# Patient Record
Sex: FEMALE | Race: WHITE | Employment: OTHER | URBAN - METROPOLITAN AREA
[De-identification: names, ages, dates, MRNs, and addresses within clinical notes are randomized per-mention and may not be internally consistent; named-entity substitution may affect disease eponyms.]

---

## 2018-03-27 ENCOUNTER — HOSPITAL ENCOUNTER (INPATIENT)
Facility: CLINIC | Age: 83
LOS: 3 days | Discharge: NURSING, REHAB OR LONG-TERM CARE | DRG: 871 | End: 2018-03-30
Attending: EMERGENCY MEDICINE | Admitting: INTERNAL MEDICINE
Payer: MEDICARE

## 2018-03-27 ENCOUNTER — APPOINTMENT (OUTPATIENT)
Dept: GENERAL RADIOLOGY | Facility: CLINIC | Age: 83
DRG: 871 | End: 2018-03-27
Attending: EMERGENCY MEDICINE
Payer: MEDICARE

## 2018-03-27 DIAGNOSIS — R65.20 SEVERE SEPSIS (H): ICD-10-CM

## 2018-03-27 DIAGNOSIS — A41.9 SEVERE SEPSIS (H): ICD-10-CM

## 2018-03-27 DIAGNOSIS — J20.9 ACUTE BRONCHITIS, UNSPECIFIED ORGANISM: Primary | ICD-10-CM

## 2018-03-27 DIAGNOSIS — R31.9 URINARY TRACT INFECTION WITH HEMATURIA, SITE UNSPECIFIED: ICD-10-CM

## 2018-03-27 DIAGNOSIS — N39.0 URINARY TRACT INFECTION WITH HEMATURIA, SITE UNSPECIFIED: ICD-10-CM

## 2018-03-27 DIAGNOSIS — I48.20 CHRONIC ATRIAL FIBRILLATION (H): ICD-10-CM

## 2018-03-27 PROBLEM — N30.90 CYSTITIS: Status: ACTIVE | Noted: 2018-03-27

## 2018-03-27 LAB
ALBUMIN SERPL-MCNC: 3.2 G/DL (ref 3.4–5)
ALBUMIN UR-MCNC: 30 MG/DL
ALP SERPL-CCNC: 135 U/L (ref 40–150)
ALT SERPL W P-5'-P-CCNC: 15 U/L (ref 0–50)
ANION GAP SERPL CALCULATED.3IONS-SCNC: 9 MMOL/L (ref 3–14)
APPEARANCE UR: ABNORMAL
AST SERPL W P-5'-P-CCNC: 31 U/L (ref 0–45)
BACTERIA #/AREA URNS HPF: ABNORMAL /HPF
BASOPHILS # BLD AUTO: 0 10E9/L (ref 0–0.2)
BASOPHILS NFR BLD AUTO: 0.3 %
BILIRUB SERPL-MCNC: 0.6 MG/DL (ref 0.2–1.3)
BILIRUB UR QL STRIP: NEGATIVE
BUN SERPL-MCNC: 16 MG/DL (ref 7–30)
CALCIUM SERPL-MCNC: 8.7 MG/DL (ref 8.5–10.1)
CHLORIDE SERPL-SCNC: 99 MMOL/L (ref 94–109)
CO2 SERPL-SCNC: 26 MMOL/L (ref 20–32)
COLOR UR AUTO: YELLOW
CREAT SERPL-MCNC: 0.83 MG/DL (ref 0.52–1.04)
DIFFERENTIAL METHOD BLD: ABNORMAL
EOSINOPHIL # BLD AUTO: 0 10E9/L (ref 0–0.7)
EOSINOPHIL NFR BLD AUTO: 0 %
ERYTHROCYTE [DISTWIDTH] IN BLOOD BY AUTOMATED COUNT: 13.6 % (ref 10–15)
FLUAV+FLUBV AG SPEC QL: NEGATIVE
FLUAV+FLUBV AG SPEC QL: NEGATIVE
FLUAV+FLUBV RNA SPEC QL NAA+PROBE: NEGATIVE
FLUAV+FLUBV RNA SPEC QL NAA+PROBE: NEGATIVE
GFR SERPL CREATININE-BSD FRML MDRD: 64 ML/MIN/1.7M2
GLUCOSE BLDC GLUCOMTR-MCNC: 117 MG/DL (ref 70–99)
GLUCOSE SERPL-MCNC: 200 MG/DL (ref 70–99)
GLUCOSE UR STRIP-MCNC: NEGATIVE MG/DL
HCT VFR BLD AUTO: 35.6 % (ref 35–47)
HGB BLD-MCNC: 11.8 G/DL (ref 11.7–15.7)
HGB UR QL STRIP: ABNORMAL
IMM GRANULOCYTES # BLD: 0.1 10E9/L (ref 0–0.4)
IMM GRANULOCYTES NFR BLD: 0.6 %
INR PPP: 1.09 (ref 0.86–1.14)
INTERPRETATION ECG - MUSE: NORMAL
KETONES UR STRIP-MCNC: NEGATIVE MG/DL
LACTATE BLD-SCNC: 1.8 MMOL/L (ref 0.7–2)
LACTATE BLD-SCNC: 2.6 MMOL/L (ref 0.7–2)
LACTATE BLD-SCNC: 2.8 MMOL/L (ref 0.7–2)
LEUKOCYTE ESTERASE UR QL STRIP: ABNORMAL
LYMPHOCYTES # BLD AUTO: 1.1 10E9/L (ref 0.8–5.3)
LYMPHOCYTES NFR BLD AUTO: 9.9 %
MAGNESIUM SERPL-MCNC: 1.4 MG/DL (ref 1.6–2.3)
MAGNESIUM SERPL-MCNC: 2.9 MG/DL (ref 1.6–2.3)
MCH RBC QN AUTO: 30.9 PG (ref 26.5–33)
MCHC RBC AUTO-ENTMCNC: 33.1 G/DL (ref 31.5–36.5)
MCV RBC AUTO: 93 FL (ref 78–100)
MONOCYTES # BLD AUTO: 0.4 10E9/L (ref 0–1.3)
MONOCYTES NFR BLD AUTO: 4 %
NEUTROPHILS # BLD AUTO: 9.2 10E9/L (ref 1.6–8.3)
NEUTROPHILS NFR BLD AUTO: 85.2 %
NITRATE UR QL: NEGATIVE
NRBC # BLD AUTO: 0 10*3/UL
NRBC BLD AUTO-RTO: 0 /100
NT-PROBNP SERPL-MCNC: 702 PG/ML (ref 0–1800)
PH UR STRIP: 7 PH (ref 5–7)
PHOSPHATE SERPL-MCNC: 2.6 MG/DL (ref 2.5–4.5)
PLATELET # BLD AUTO: 165 10E9/L (ref 150–450)
PLATELET # BLD AUTO: 181 10E9/L (ref 150–450)
POTASSIUM SERPL-SCNC: 4 MMOL/L (ref 3.4–5.3)
PROT SERPL-MCNC: 7.5 G/DL (ref 6.8–8.8)
RBC # BLD AUTO: 3.82 10E12/L (ref 3.8–5.2)
RBC #/AREA URNS AUTO: 36 /HPF (ref 0–2)
RSV RNA SPEC NAA+PROBE: NEGATIVE
SODIUM SERPL-SCNC: 134 MMOL/L (ref 133–144)
SOURCE: ABNORMAL
SP GR UR STRIP: 1.01 (ref 1–1.03)
SPECIMEN SOURCE: NORMAL
SPECIMEN SOURCE: NORMAL
TROPONIN I SERPL-MCNC: 0.02 UG/L (ref 0–0.04)
UROBILINOGEN UR STRIP-MCNC: NORMAL MG/DL (ref 0–2)
WBC # BLD AUTO: 10.8 10E9/L (ref 4–11)
WBC #/AREA URNS AUTO: >182 /HPF (ref 0–5)
WBC CLUMPS #/AREA URNS HPF: PRESENT /HPF

## 2018-03-27 PROCEDURE — 81001 URINALYSIS AUTO W/SCOPE: CPT | Performed by: EMERGENCY MEDICINE

## 2018-03-27 PROCEDURE — 83880 ASSAY OF NATRIURETIC PEPTIDE: CPT | Performed by: EMERGENCY MEDICINE

## 2018-03-27 PROCEDURE — 25000128 H RX IP 250 OP 636: Performed by: NURSE PRACTITIONER

## 2018-03-27 PROCEDURE — 87186 SC STD MICRODIL/AGAR DIL: CPT | Performed by: EMERGENCY MEDICINE

## 2018-03-27 PROCEDURE — 83605 ASSAY OF LACTIC ACID: CPT | Performed by: NURSE PRACTITIONER

## 2018-03-27 PROCEDURE — 36415 COLL VENOUS BLD VENIPUNCTURE: CPT | Performed by: NURSE PRACTITIONER

## 2018-03-27 PROCEDURE — 25000125 ZZHC RX 250: Performed by: NURSE PRACTITIONER

## 2018-03-27 PROCEDURE — 71046 X-RAY EXAM CHEST 2 VIEWS: CPT

## 2018-03-27 PROCEDURE — 12000000 ZZH R&B MED SURG/OB

## 2018-03-27 PROCEDURE — 25000132 ZZH RX MED GY IP 250 OP 250 PS 637: Mod: GY | Performed by: EMERGENCY MEDICINE

## 2018-03-27 PROCEDURE — 00000146 ZZHCL STATISTIC GLUCOSE BY METER IP

## 2018-03-27 PROCEDURE — 99285 EMERGENCY DEPT VISIT HI MDM: CPT | Mod: 25

## 2018-03-27 PROCEDURE — 87631 RESP VIRUS 3-5 TARGETS: CPT | Performed by: NURSE PRACTITIONER

## 2018-03-27 PROCEDURE — 25000132 ZZH RX MED GY IP 250 OP 250 PS 637: Mod: GY | Performed by: NURSE PRACTITIONER

## 2018-03-27 PROCEDURE — 84100 ASSAY OF PHOSPHORUS: CPT | Performed by: EMERGENCY MEDICINE

## 2018-03-27 PROCEDURE — 85025 COMPLETE CBC W/AUTO DIFF WBC: CPT | Performed by: EMERGENCY MEDICINE

## 2018-03-27 PROCEDURE — 25000132 ZZH RX MED GY IP 250 OP 250 PS 637: Mod: GY | Performed by: INTERNAL MEDICINE

## 2018-03-27 PROCEDURE — 83605 ASSAY OF LACTIC ACID: CPT | Performed by: EMERGENCY MEDICINE

## 2018-03-27 PROCEDURE — A9270 NON-COVERED ITEM OR SERVICE: HCPCS | Mod: GY | Performed by: INTERNAL MEDICINE

## 2018-03-27 PROCEDURE — 87804 INFLUENZA ASSAY W/OPTIC: CPT | Performed by: EMERGENCY MEDICINE

## 2018-03-27 PROCEDURE — 85610 PROTHROMBIN TIME: CPT | Performed by: EMERGENCY MEDICINE

## 2018-03-27 PROCEDURE — 96367 TX/PROPH/DG ADDL SEQ IV INF: CPT

## 2018-03-27 PROCEDURE — 83735 ASSAY OF MAGNESIUM: CPT | Performed by: EMERGENCY MEDICINE

## 2018-03-27 PROCEDURE — 87086 URINE CULTURE/COLONY COUNT: CPT | Performed by: EMERGENCY MEDICINE

## 2018-03-27 PROCEDURE — 99223 1ST HOSP IP/OBS HIGH 75: CPT | Mod: AI | Performed by: NURSE PRACTITIONER

## 2018-03-27 PROCEDURE — 83735 ASSAY OF MAGNESIUM: CPT | Performed by: NURSE PRACTITIONER

## 2018-03-27 PROCEDURE — 25000128 H RX IP 250 OP 636: Performed by: EMERGENCY MEDICINE

## 2018-03-27 PROCEDURE — 85049 AUTOMATED PLATELET COUNT: CPT | Performed by: NURSE PRACTITIONER

## 2018-03-27 PROCEDURE — 80053 COMPREHEN METABOLIC PANEL: CPT | Performed by: EMERGENCY MEDICINE

## 2018-03-27 PROCEDURE — 87088 URINE BACTERIA CULTURE: CPT | Performed by: EMERGENCY MEDICINE

## 2018-03-27 PROCEDURE — 84484 ASSAY OF TROPONIN QUANT: CPT | Performed by: EMERGENCY MEDICINE

## 2018-03-27 PROCEDURE — A9270 NON-COVERED ITEM OR SERVICE: HCPCS | Mod: GY | Performed by: NURSE PRACTITIONER

## 2018-03-27 PROCEDURE — 87040 BLOOD CULTURE FOR BACTERIA: CPT | Performed by: EMERGENCY MEDICINE

## 2018-03-27 PROCEDURE — 96365 THER/PROPH/DIAG IV INF INIT: CPT

## 2018-03-27 PROCEDURE — A9270 NON-COVERED ITEM OR SERVICE: HCPCS | Mod: GY | Performed by: EMERGENCY MEDICINE

## 2018-03-27 RX ORDER — POTASSIUM CHLORIDE 7.45 MG/ML
10 INJECTION INTRAVENOUS
Status: DISCONTINUED | OUTPATIENT
Start: 2018-03-27 | End: 2018-03-29

## 2018-03-27 RX ORDER — MAGNESIUM SULFATE HEPTAHYDRATE 40 MG/ML
4 INJECTION, SOLUTION INTRAVENOUS EVERY 4 HOURS PRN
Status: DISCONTINUED | OUTPATIENT
Start: 2018-03-27 | End: 2018-03-29

## 2018-03-27 RX ORDER — HEPARIN SODIUM 5000 [USP'U]/.5ML
5000 INJECTION, SOLUTION INTRAVENOUS; SUBCUTANEOUS EVERY 12 HOURS
Status: DISCONTINUED | OUTPATIENT
Start: 2018-03-27 | End: 2018-03-27

## 2018-03-27 RX ORDER — METOPROLOL TARTRATE 1 MG/ML
2.5 INJECTION, SOLUTION INTRAVENOUS EVERY 4 HOURS PRN
Status: DISCONTINUED | OUTPATIENT
Start: 2018-03-27 | End: 2018-03-30 | Stop reason: HOSPADM

## 2018-03-27 RX ORDER — CEFTRIAXONE 2 G/1
2 INJECTION, POWDER, FOR SOLUTION INTRAMUSCULAR; INTRAVENOUS EVERY 24 HOURS
Status: DISCONTINUED | OUTPATIENT
Start: 2018-03-28 | End: 2018-03-30 | Stop reason: HOSPADM

## 2018-03-27 RX ORDER — SODIUM CHLORIDE 9 MG/ML
INJECTION, SOLUTION INTRAVENOUS CONTINUOUS
Status: DISCONTINUED | OUTPATIENT
Start: 2018-03-27 | End: 2018-03-29

## 2018-03-27 RX ORDER — POTASSIUM CHLORIDE 1500 MG/1
20-40 TABLET, EXTENDED RELEASE ORAL
Status: DISCONTINUED | OUTPATIENT
Start: 2018-03-27 | End: 2018-03-29

## 2018-03-27 RX ORDER — NALOXONE HYDROCHLORIDE 0.4 MG/ML
.1-.4 INJECTION, SOLUTION INTRAMUSCULAR; INTRAVENOUS; SUBCUTANEOUS
Status: DISCONTINUED | OUTPATIENT
Start: 2018-03-27 | End: 2018-03-30 | Stop reason: HOSPADM

## 2018-03-27 RX ORDER — ACETAMINOPHEN 325 MG/1
650 TABLET ORAL ONCE
Status: COMPLETED | OUTPATIENT
Start: 2018-03-27 | End: 2018-03-27

## 2018-03-27 RX ORDER — CEFTRIAXONE 2 G/1
2 INJECTION, POWDER, FOR SOLUTION INTRAMUSCULAR; INTRAVENOUS ONCE
Status: COMPLETED | OUTPATIENT
Start: 2018-03-27 | End: 2018-03-27

## 2018-03-27 RX ORDER — OSELTAMIVIR PHOSPHATE 30 MG/1
30 CAPSULE ORAL DAILY
Status: DISCONTINUED | OUTPATIENT
Start: 2018-03-27 | End: 2018-03-28

## 2018-03-27 RX ORDER — AMOXICILLIN 250 MG
1 CAPSULE ORAL 2 TIMES DAILY PRN
Status: DISCONTINUED | OUTPATIENT
Start: 2018-03-27 | End: 2018-03-30 | Stop reason: HOSPADM

## 2018-03-27 RX ORDER — POTASSIUM CL/LIDO/0.9 % NACL 10MEQ/0.1L
10 INTRAVENOUS SOLUTION, PIGGYBACK (ML) INTRAVENOUS
Status: DISCONTINUED | OUTPATIENT
Start: 2018-03-27 | End: 2018-03-29

## 2018-03-27 RX ORDER — ACETAMINOPHEN 325 MG/1
650 TABLET ORAL EVERY 4 HOURS PRN
Status: DISCONTINUED | OUTPATIENT
Start: 2018-03-27 | End: 2018-03-30 | Stop reason: HOSPADM

## 2018-03-27 RX ORDER — POTASSIUM CHLORIDE 1.5 G/1.58G
20-40 POWDER, FOR SOLUTION ORAL
Status: DISCONTINUED | OUTPATIENT
Start: 2018-03-27 | End: 2018-03-29

## 2018-03-27 RX ORDER — OSELTAMIVIR PHOSPHATE 75 MG/1
75 CAPSULE ORAL 2 TIMES DAILY
Status: DISCONTINUED | OUTPATIENT
Start: 2018-03-27 | End: 2018-03-27 | Stop reason: DRUGHIGH

## 2018-03-27 RX ORDER — MAGNESIUM SULFATE HEPTAHYDRATE 40 MG/ML
4 INJECTION, SOLUTION INTRAVENOUS ONCE
Status: COMPLETED | OUTPATIENT
Start: 2018-03-27 | End: 2018-03-27

## 2018-03-27 RX ORDER — AMOXICILLIN 250 MG
1 CAPSULE ORAL 2 TIMES DAILY PRN
COMMUNITY

## 2018-03-27 RX ORDER — PANTOPRAZOLE SODIUM 40 MG/1
40 TABLET, DELAYED RELEASE ORAL DAILY
Status: DISCONTINUED | OUTPATIENT
Start: 2018-03-27 | End: 2018-03-30 | Stop reason: HOSPADM

## 2018-03-27 RX ORDER — POTASSIUM CHLORIDE 29.8 MG/ML
20 INJECTION INTRAVENOUS
Status: DISCONTINUED | OUTPATIENT
Start: 2018-03-27 | End: 2018-03-29

## 2018-03-27 RX ADMIN — HEPARIN SODIUM 5000 UNITS: 5000 INJECTION, SOLUTION INTRAVENOUS; SUBCUTANEOUS at 13:54

## 2018-03-27 RX ADMIN — POTASSIUM CHLORIDE 20 MEQ: 1500 TABLET, EXTENDED RELEASE ORAL at 18:25

## 2018-03-27 RX ADMIN — SODIUM CHLORIDE: 9 INJECTION, SOLUTION INTRAVENOUS at 23:52

## 2018-03-27 RX ADMIN — ACETAMINOPHEN 650 MG: 325 TABLET, FILM COATED ORAL at 10:04

## 2018-03-27 RX ADMIN — AZITHROMYCIN MONOHYDRATE 500 MG: 500 INJECTION, POWDER, LYOPHILIZED, FOR SOLUTION INTRAVENOUS at 12:28

## 2018-03-27 RX ADMIN — PANTOPRAZOLE SODIUM 40 MG: 40 TABLET, DELAYED RELEASE ORAL at 13:54

## 2018-03-27 RX ADMIN — SODIUM CHLORIDE: 9 INJECTION, SOLUTION INTRAVENOUS at 10:22

## 2018-03-27 RX ADMIN — POTASSIUM PHOSPHATE, MONOBASIC AND POTASSIUM PHOSPHATE, DIBASIC 10 MMOL: 224; 236 INJECTION, SOLUTION INTRAVENOUS at 18:17

## 2018-03-27 RX ADMIN — OSELTAMIVIR PHOSPHATE 30 MG: 30 CAPSULE ORAL at 13:54

## 2018-03-27 RX ADMIN — MAGNESIUM SULFATE IN WATER 4 G: 40 INJECTION, SOLUTION INTRAVENOUS at 14:11

## 2018-03-27 RX ADMIN — CEFTRIAXONE 2 G: 2 INJECTION, POWDER, FOR SOLUTION INTRAMUSCULAR; INTRAVENOUS at 10:20

## 2018-03-27 ASSESSMENT — ENCOUNTER SYMPTOMS
COUGH: 1
ABDOMINAL PAIN: 0
SHORTNESS OF BREATH: 1
VOMITING: 0
HEADACHES: 0

## 2018-03-27 NOTE — ED NOTES
[facility]  ED Nurse Handoff Report    ED Chief complaint: Cough (NH pt shakey and SOB today , incont urine, a fib 110-130 , baseline dementia )      ED Diagnosis:   Final diagnoses:   Urinary tract infection with hematuria, site unspecified   Severe sepsis (H)   Chronic atrial fibrillation (H)       Code Status: POLST     Allergies: No Known Allergies    Activity level - Baseline/Home:  Stand with Assist    Activity Level - Current:   Unable to Assess     Needed?: No    Isolation: droplet per hospitalist group   Infection: Not Applicable    Bariatric?: No    Vital Signs:   Vitals:    03/27/18 0931 03/27/18 0945 03/27/18 1021   BP: 145/85 118/85 129/64   Pulse: 92     Resp: 20 (!) 33 28   Temp: 100.4  F (38  C)     TempSrc: Oral     SpO2: 92% 92% 95%   Weight: 40.8 kg (90 lb)         Cardiac Rhythm: ,        Pain level:      Is this patient confused?: Yes     Patient Report: Initial Complaint: NH called 911 for Shakey and SOB,   Focused Assessment: At baseline dementia, cough, febrile , weak confused   Tests Performed: labs, cxr   Abnormal Results: urine, lactate   Treatments provided: rocephin, NS at 75 cc hour     Family Comments: niece, POA  Here     OBS brochure/video discussed/provided to patient: No    ED Medications:   Medications   cefTRIAXone (ROCEPHIN) 2 g vial to attach to  ml bag for ADULTS or NS 50 ml bag for PEDS (2 g Intravenous New Bag 3/27/18 1020)   sodium chloride 0.9% infusion ( Intravenous New Bag 3/27/18 1022)   acetaminophen (TYLENOL) tablet 650 mg (650 mg Oral Given 3/27/18 1004)       Drips infusing?:  Yes    For the majority of the shift this patient was Green.   Interventions performed were na.    Severe Sepsis OR Septic Shock Diagnosis Present:   Yes    Per the ED Provider, Time Zero for severe sepsis or septic shock is:      3 Hour Severe Sepsis Bundle Completion:  1. Initial Lactic Acid Result:   Recent Labs   Lab Test  03/27/18   0934   LACT  2.8*      2. Blood Cultures before Antibiotics: Yes  3. Broad Spectrum Antibiotics Administered:     Anti-infectives (Future)    Start     Dose/Rate Route Frequency Ordered Stop    03/27/18 1016  cefTRIAXone (ROCEPHIN) 2 g vial to attach to  ml bag for ADULTS or NS 50 ml bag for PEDS      2 g  over 30 Minutes Intravenous ONCE 03/27/18 1015          4. 100 ml of IV fluids have been given so far      6 Hour Severe Sepsis Bundle Completion:    1. Repeat Lactic Acid Level: Not drawn yet   2. Patient currently on Vasopressors =  No      ED NURSE PHONE NUMBER: [phone]

## 2018-03-27 NOTE — IP AVS SNAPSHOT
` [place] [address] [facility]: [phone]                                              INTERAGENCY TRANSFER FORM - NURSING   3/27/2018                    Hospital Admission Date: 3/27/2018  ELY JONES   : 7/10/1924  Sex: Female        Attending Provider: Castillo Hummel MD     Allergies:  No Known Allergies    Infection:  None   Service:  HOSPITALIST    Ht:  1.524 m (5')   Wt:  42.4 kg (93 lb 7.6 oz)   Admission Wt:  40.8 kg (90 lb)    BMI:  18.26 kg/m 2   BSA:  1.34 m 2            Patient PCP Information     Provider PCP Type    Toya Ley MD, MD General      Current Code Status     Date Active Code Status Order ID Comments User Context       Prior      Code Status History     Date Active Date Inactive Code Status Order ID Comments User Context    3/30/2018  1:34 PM  DNR/DNI [id number]  Castillo Hummel MD Outpatient    3/27/2018 12:55 PM 3/30/2018  1:34 PM DNR/DNI [id number]  Jazmyn Boykin APRN CNP Inpatient    2016 12:40 PM 2016  9:05 PM DNR/DNI [id number]  Angela Herring PA-C ED    3/2/2016  8:48 AM 2016 12:40 PM DNR/DNI [id number]  Ignacio Thomas MD Outpatient    2016  8:45 PM 3/2/2016  8:48 AM DNR/DNI [id number]  Yuliya Padilla MD Inpatient    2016  1:42 AM 2016  4:57 PM DNR/DNI [id number]  Janey Stearns MD Inpatient      Advance Directives        Scanned docmt in ACP Activity?           No scanned doc        Hospital Problems as of 3/30/2018              Priority Class Noted POA    Cystitis Medium  3/27/2018 Yes      Non-Hospital Problems as of 3/30/2018              Priority Class Noted    GI bleeding Medium  2016    CHF (congestive heart failure) (H) Medium  2016    Sacral fracture (H) Medium  2016    ACP (advance care planning) Medium  2016      Immunizations     None         END      ASSESSMENT     Discharge Profile Flowsheet     EXPECTED DISCHARGE     Resources List  Skilled Nursing  "Facility 03/02/16 1401    Expected Discharge Date  04/02/18 (AL) 03/29/18 1638   PAS Number  [id number] 03/02/16 1401    DISCHARGE NEEDS ASSESSMENT     Senior Linkage Line Referral Placed  03/01/16 03/02/16 1401    Concerns To Be Addressed  discharge planning concerns 05/07/16 1416   SKIN      Equipment Currently Used at Home  walker, rolling 03/28/18 0904   Inspection of bony prominences  Full 03/30/18 0808    # of Referrals Placed by CTS  Post Acute Facilities;Transportation 03/30/18 1455   Inspection under devices  Full 03/29/18 2041    Equipment Used at Home  cane, straight;grab bar;lifeline/medical alert;raised toilet (Hurry cane) 03/01/16 0938   Skin WDL  ex 03/30/18 0808    GASTROINTESTINAL (ADULT,PEDIATRIC,OB)     Skin Color/Characteristics  bruised (ecchymotic) 03/29/18 2041    GI WDL  ex 03/29/18 2041   Skin Temperature  warm 03/29/18 2041    Last Bowel Movement  03/28/18 03/29/18 0014   Skin Moisture  dry 03/29/18 2041    GI Signs/Symptoms  fecal incontinence 03/29/18 2041   Skin Elasticity  quick return to original state 03/29/18 1049    Passing flatus  yes 03/29/18 2041   Skin Integrity  bruise(s) 03/30/18 0808    COMMUNICATION ASSESSMENT     SAFETY      Patient's communication style  spoken language (English or Bilingual) 03/27/18 0929   Safety WDL  WDL 03/30/18 0957    FINAL RESOURCES     All Alarms  alarm(s) activated and audible 03/30/18 0957                 Assessment WDL (Within Defined Limits) Definitions           Safety WDL     Effective: 09/28/15    Row Information: <b>WDL Definition:</b> Bed in low position, wheels locked; call light in reach; upper side rails up x 2; ID band on<br> <font color=\"gray\"><i>Item=AS safety wdl>>List=AS safety wdl>>Version=F14</i></font>      Skin WDL     Effective: 09/28/15    Row Information: <b>WDL Definition:</b> Warm; dry; intact; elastic; without discoloration; pressure points without redness<br> <font color=\"gray\"><i>Item=AS skin wdl>>List=AS skin " wdl>>Version=F14</i></font>      Vitals     Vital Signs Flowsheet     VITAL SIGNS     Weight  42.4 kg (93 lb 7.6 oz) 03/30/18 0630    Temp  98.5  F (36.9  C) 03/30/18 0726   Weight Method  Bed scale 03/30/18 0630    Temp src  Oral 03/30/18 0726   BSA (Calculated - sq m)  1.31 03/27/18 1302    Resp  16 03/30/18 0726   BMI (Calculated)  17.6 03/27/18 1302    Pulse  86 03/30/18 0726   MADELINE COMA SCALE      Heart Rate  90 03/30/18 0726   Best Eye Response  4-->(E4) spontaneous 03/29/18 1810    Pulse/Heart Rate Source  Monitor 03/30/18 0726   Best Motor Response  6-->(M6) obeys commands 03/29/18 1810    BP  140/74 03/30/18 0726   Best Verbal Response  4-->(V4) confused 03/29/18 1810    BP Location  Right arm 03/30/18 0726   [place] Coma Scale Score  14 03/29/18 1810    OXYGEN THERAPY     POSITIONING      SpO2  91 % 03/30/18 0726   Body Position  legs elevated;side-lying, right 03/30/18 1323    O2 Device  None (Room air) 03/30/18 0726   Head of Bed (HOB)  HOB at 20-30 degrees 03/30/18 1323    PAIN/COMFORT     Positioning/Transfer Devices  pillows;in use 03/30/18 1323    Patient Currently in Pain  denies 03/29/18 2035   Chair  Upright in chair 03/30/18 0903    FACES Pain Rating  0-->no hurt 03/29/18 0004   DAILY CARE      HEIGHT AND WEIGHT     Activity Management  up to bedside commode 03/30/18 1513    Height  1.524 m (5') 03/27/18 1302   Activity Assistance Provided  assistance, 1 person 03/30/18 1513    Height Method  Estimated 03/27/18 0933                 Patient Lines/Drains/Airways Status    Active LINES/DRAINS/AIRWAYS     Name: Placement date: Placement time: Site: Days: Last dressing change:    Peripheral IV 02/25/16 Left Lower forearm 02/25/16   0247   Lower forearm   764             Patient Lines/Drains/Airways Status    Active PICC/CVC     None            Intake/Output Detail Report     Date Intake     Output    Shift P.O. I.V. IV Piggyback Total Total       Day 03/29/18 0700 - 03/29/18 0579 -- -- -- -- -- 0     Miriam 03/29/18 1500 - 03/29/18 2259 120 -- -- 120 -- 120    Noc 03/29/18 2300 - 03/30/18 0659 -- -- -- -- -- 0    Day 03/30/18 0700 - 03/30/18 1459 360 -- -- 360 -- 360    Miriam 03/30/18 1500 - 03/30/18 2259 -- -- -- -- -- 0      Last Void/BM       Most Recent Value    Urine Occurrence 2 at 03/30/2018 1512    Stool Occurrence 1 at 03/30/2018 1104      Case Management/Discharge Planning     Case Management/Discharge Planning Flowsheet     REFERRAL INFORMATION     Concerns To Be Addressed  discharge planning concerns 05/07/16 1416    Arrived From  nursing facility 05/07/16 1416   DISCHARGE PLANNING      # of Referrals Placed by CTS  Post Acute Facilities;Transportation 03/30/18 1455   Equipment Used at Home  cane, straight;grab bar;lifeline/medical alert;raised toilet (Hurry cane) 03/01/16 0938    Post Acute Facilities  AL 03/30/18 1455   FINAL RESOURCES      Record Reviewed  clinical discipline documentation;history and physical;medical record;patient profile;plan of care 03/29/18 1441   Equipment Currently Used at Home  walker, rolling 03/28/18 0904    CTS Assigned to Case  Snehal Cochran 03/30/18 1455   Resources List  Skilled Nursing Facility 03/02/16 1401    LIVING ENVIRONMENT     PAS Number  [id number] 03/02/16 1401    Lives With  facility resident 03/29/18 1441   Senior Linkage Line Referral Placed  03/01/16 03/02/16 1401    Living Arrangements  assisted living 03/29/18 1441   ABUSE RISK SCREEN      Provides Primary Care For  no one, unable/limited ability to care for self 03/29/18 1441   QUESTION TO PATIENT:  Has a member of your family or a partner(now or in the past) intimidated, hurt, manipulated, or controlled you in any way?  no 03/27/18 0929    COPING/STRESS     OTHER      Major Change/Loss/Stressor  death of a loved one;hospitalization;illness 03/27/18 1337   Are you depressed or being treated for depression?  No 03/27/18 1338    EXPECTED DISCHARGE     HOMICIDE RISK      Expected Discharge Date  04/02/18  (AL) 03/29/18 5700   Feels Like Hurting Others  no 03/27/18 0914    ASSESSMENT/CONCERNS TO BE ADDRESSED

## 2018-03-27 NOTE — IP AVS SNAPSHOT
` `           [place] [address] [facility]: [phone]                 INTERAGENCY TRANSFER FORM - NOTES (H&P, Discharge Summary, Consults, Procedures, Therapies)   3/27/2018                    Hospital Admission Date: 3/27/2018  KYLAH CONROY   : 7/10/1924  Sex: Female        Patient PCP Information     Provider PCP Type    Toya Ley MD, MD General         History & Physicals      H&P by Jazmyn Boykin APRN CNP at 3/27/2018 11:48 AM     Author:  Jazmyn Boykin APRN CNP Service:  General Medicine Author Type:  Nurse Practitioner    Filed:  3/27/2018 12:22 PM Date of Service:  3/27/2018 11:48 AM Creation Time:  3/27/2018 11:28 AM    Status:  Attested :  Jazmyn Boykin APRN CNP (Nurse Practitioner)    Cosigner:  Castilol Hummel MD at 3/27/2018  3:28 PM        Attestation signed by Castillo Hummel MD at 3/27/2018  3:28 PM        Physician Attestation   I, Castillo Hummel, have reviewed and discussed with the advanced practice provider their history, physical and plan for Kylah Conroy. I did not participate in a shared visit by interviewing or examining the patient and this should be billed as an advanced practice provider only visit.    Castillo Hummel  Date of Service (when I saw the patient): I did not personally see this patient today.                               [facility]    History and Physical  Hospitalist       Date of Admission:  3/27/2018[EB1.1]    Assessment & Plan[EB1.2]   Kylah Conroy is a 89 year old female who presents with sepsis[EB1.1] from urinary source, possible pulmonary source,[EB1.3] possible change in speech.    Summary:      Sepsis from a urinary source with lactic acidosis  Continue ceftriaxone  Recheck lactic acid  Continue gentle volume expansion    Possible bronchitis versus HAP versus influenza  Check influenza and RSV PCR[EB1.1], continue droplet precautions.[EB1.3]  Start empiric Tamiflu[EB1.4]  add  azithromycin   Consider checking a chest x-ray in the morning    A. fib chronic with RVR previously on digoxin[EB1.1] but discontinued approximately 6 months ago[EB1.3]  Telemetry monitoring  We will add as needed metoprolol IV for heart rate sustained greater than 120[EB1.1]  We will check mag and phosphorus and start electrolyte replacement protocols, aiming to keep K greater than 4 and mag greater than 2[EB1.4]    Likely dementia but without formal evaluation with possible new speech deficit/expressive aphasia per family but not appreciated on my exam  In discussing this with patient's niece who is her healthcare power of  she does not desire[EB1.1] aggressive[EB1.3] diagnostics such as MRI and/or neurologic consultation.[EB1.1]  We d[EB1.3]iscussed optimizing her metabolic parameters i.e. glucose sodium, treating active infection and reevaluate her mentation once those have been addressed.  We will need to remain vigilant for delirium she is certainly at high risk given her likely dementia, active infection, new environment, decreased visual acuity[EB1.1]  Consult PT and activate fall precautions[EB1.4]    Mild hyponatremia  Likely hypovolemic type  Gentle volume expansion with 0.9% saline  Recheck BMP in the morning    Hyperglycemia, likely stress-induced  We will check point-of-care glucose every shift[EB1.1]    HFpEF with last echo in 2016 showing LVEF of 65%, increased LV filling pressures, severe biatrial enlargement, mild to moderate TR and AR[EB1.3]  Hold diuretics for now noting though that she may require brisk initiation and/or IV diuretics at some point as the A. fib and cystitis may likely precipitate an acute decompensation    DVT Prophylaxis: Heparin SQ  Code Status: DNR / DNI    Disposition: Expected discharge in her than equal to 2 midnight[EB1.1]    Total time is 45 minutes from 1033 to 10:42 AM, 1048 -11:22 AM the latter of which was face-to-face time.  Remainder was spent in counseling  coronation of care[EB1.4]    Jazmyn Boykin[EB1.2], SATINDER RON[EB1.1]    Primary Care Physician   Toya Ley MD    Chief Complaint[EB1.2]   Cough likely sepsis from cystitis    History is obtained from the patient but primarily her niece[EB1.1] Nuvia[EB1.3]      History of Present Illness[EB1.2]   Kylah Conroy is a 89 year old female with past medical history of atrial fibrillation without anticoagulation secondary to prior history of bleeding, heart failure NOS, dementia though not formally evaluated by neurology, ischemic stroke in 2011 without residual deficits.  Ms.[EB1.1]Rafiq[EB1.3] resides at a nursing home.  On Sunday, 3/25/2018 patient was felt to be in her usual state of health as per her niece who is her healthcare power of .  In what has an apparent abrupt onset of illness yesterday patient felt very cold and had shaking chills despite 4 layers of blankets.  Staff at the nursing home reported a temperature of 98.4 but that was after Tylenol.  There is concern that she had an upper respiratory infection after recent ill contact with another loved one with a URI.  Today staff at the nursing home were unable to obtain vital signs at breakfast.  They also noted that she had a cough and was shaky.  EMS was contacted who transferred patient to [facility] emergency department.[EB1.1]  In the[EB1.3] Emergency department rapid influenza screen was negative she had a UA that was positive with large leukocyte esterase, WBCs greater than 182, bacteria and blood.  Urine culture is pending lactic acid was elevated at 2.8 blood cultures were obtained.  Because of her history of heart failure she was not volume resuscitated and started on maintenance IV fluids.  She was given acetaminophen for temp of 100.4 started on ceftriaxone.  Chest x-ray showed some peripheral vascular congestion and rotation to the left but no acute infiltrates[EB1.1].[EB1.3] ECG showed atrial fibrillation and  possible inferolateral ST depression.  Because of concern for sepsis from a urinary source the hospitalist service was asked to admit her for further evaluation and management.   In speaking with patient's niece Nuvia she also noted that after lunchtime on 3[EB1.1]/[EB1.3]26[EB1.1]/18[EB1.3] patient had difficulty with speech with difficulty getting the words out and she felt this was new.   Also in the last 24 hours her conversation is not making sense to the situation and she is not recognizing her niece.  We discussed patient's CODE STATUS and I reviewed her advanced directive.  Patient's[EB1.1] niece[EB1.3] reaffirms DNR/DNI.  After the exam and discussing overall aggressiveness of the evaluation during this hospitalization[EB1.1],[EB1.3] in particular whether or not the family would value neurologic workup inclusive of MRI and neurology consult etc. niece[EB1.1] Nuvia[EB1.3] opted for gentle medical management without pursuing aggressive diagnostics.[EB1.1]    Past Medical History[EB1.2]    I have reviewed this patient's medical history and updated it with pertinent information if needed.[EB1.1]   Past Medical History:   Diagnosis Date     Atrial fibrillation (H)      Rectal bleeding      Unspecified cerebral artery occlusion with cerebral infarction[EB1.5]    H[EB1.1]FpEF[EB1.3]    Past Surgical History[EB1.2]   I have reviewed this patient's surgical history and updated it with pertinent information if needed.[EB1.1]  Past Surgical History:   Procedure Laterality Date     APPENDECTOMY[EB1.5]         Prior to Admission Medications   Prior to Admission Medications   Prescriptions Last Dose Informant Patient Reported? Taking?   ACETAMINOPHEN PO 3/27/2018 at Unknown time Nursing Home Yes Yes   Sig: Take 650 mg by mouth 2 times daily   DIPHENHYDRAMINE HCL PO Past Week at Unknown time Nursing Home Yes Yes   Sig: Take 25 mg by mouth every 6 hours as needed   FUROSEMIDE PO 3/27/2018 at Unknown time Nursing Home Yes  Yes   Sig: Take 10 mg by mouth every morning   multivitamin, therapeutic (THERA-VIT) TABS 3/27/2018 at Unknown time Nursing Home Yes Yes   Sig: Take 1 tablet by mouth daily   pantoprazole (PROTONIX) 40 MG enteric coated tablet 3/26/2018 at 1600 Nursing Home No Yes   Sig: Take 1 tablet (40 mg) by mouth daily Take 30-60 minutes before a meal.   Patient taking differently: Take 40 mg by mouth daily Take 30-60 minutes before a meal.    Takes at 1600   senna-docusate (SENOKOT-S;PERICOLACE) 8.6-50 MG per tablet Past Month at Unknown time Nursing Home Yes Yes   Sig: Take 1 tablet by mouth 2 times daily as needed for constipation      Facility-Administered Medications: None     Allergies   No Known Allergies    Social History[EB1.2]   I have reviewed this patient's social history and updated it with pertinent information if needed. Kylah JOHNSON Rafiq[EB1.1]  reports that she has never smoked. She does not have any smokeless tobacco history on file. She reports that she drinks alcohol. She reports that she does not use illicit drugs.[EB1.5]  She is single and resides at the [facility] in the [facility].   niece and nephews Nuvia Perry and Castillo all are her healthcare power of 's[EB1.1]    Family History[EB1.2]   I have reviewed this patient's family history and updated it with pertinent information if needed.[EB1.1]   No family history on file.[EB1.6]   Sister  of stroke, mother  of stroke age 95, father  of perforated ulcer with gangrene[EB1.1]    Review of Systems[EB1.2]   CONSTITUTIONAL: Fevers chills as above no recent hospitalizations meds are distributed by the nursing home staff.  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 has partial vision in her right eye and she had been treated by an ophthalmologist but at this point is not pursuing further treatment  HEENT: Denies any sore throat or rhinorrhea  RESPIRATORY: Has had a cough ×1 day no mucus production no dyspnea on  exertion  CARDIOVASCULAR: No chest pain heaviness tightness no lower extremity edema no orthopnea[EB1.1] niece reports that within the last 6 months digoxin was discontinued[EB1.3]  GASTROINTESTINAL: One day of anorexia did not eat dinner on 3/26/2018 out of staff concern for aspiration though she does not follow any special diet has no known history of dysphasia.  No nausea vomiting diarrhea constipation or abdominal pain  GENITOURINARY: No dysuria no malodorous urine no hematuria  INTEGUMENT no acute skin changes  ENDOCRINE: No heat or cold intolerance not known to be diabetic  MUSCULOSKELETAL: Has some chronic back pain which the niece attributes to a 2-year-old compression fracture no myalgias  NEUROLOGICAL:   Baseline patient ambulates with assistance of a gait belt and attendant and her walker.  She gets full care for dressing toileting bathing and feeding.  Baseline she is not oriented to place and in the last 24 hours her conversation is not making sense to the conversation she is not recognizing her niece just in the last 24 hours    Physica[EB1.1]l Exam   Temp: 100.4  F (38  C) Temp src: Oral BP: 129/64 Pulse: 92 Heart Rate: 106 Resp: 28 SpO2: 95 % O2 Device: None (Room air)[EB1.2]    Vital Signs with Ranges[EB1.1]  Temp:  [100.4  F (38  C)] 100.4  F (38  C)  Pulse:  [92] 92  Heart Rate:  [106-114] 106  Resp:  [20-33] 28  BP: (118-145)/(64-85) 129/64  SpO2:  [92 %-95 %] 95 %  90 lbs 0 oz[EB1.2]    Constitutional: Awake, alert, cooperative, no apparent distress.  Eyes: Conjunctiva pink somewhat outwardly rotated lower lids,  pupils 3 mm and reactive bilaterally  HEENT: Tacky oral mucous membranes, normal dentition.,  No JVD  Respiratory: Clear to auscultation bilaterally upper lobes, rhonchi in the right lower lobe coughs frequently throughout the exam nonproductive  Cardiovascular: Irregularly irregular rate and rhythm, normal S1 and S2, and no murmur noted.  GI: Soft, non-distended, non-tender, normal  bowel sounds.,  No suprapubic tenderness  Skin: No rashes, no cyanosis, no edema.  Musculoskeletal: No joint swelling, erythema or tenderness.,[EB1.1]  [place] neck deformities of[EB1.3] fingers with outward deviation  Neurologic: Follows commands to squeeze hands and wiggle feet bilaterally extremity strength is grossly 5/5×4 sensation grossly preserved speech is fluent tongue is midline face is symmetric.  Able to perform Romberg testing without difficulty, she declined alternating hand movements because she could not see what I was doing.[EB1.1]  No pronator drift[EB1.3]  Psychiatric: Alert, disoriented to person, place and time, no obvious anxiety or depression.[EB1.1]    Data[EB1.2]   Data reviewed today:  I personally reviewed the EKG tracing showing Described above.[EB1.1]    Recent Labs  Lab 03/27/18  0934   WBC 10.8   HGB 11.8   MCV 93      INR 1.09      POTASSIUM 4.0   CHLORIDE 99   CO2 26   BUN 16   CR 0.83   ANIONGAP 9   ELO 8.7   *   ALBUMIN 3.2*   PROTTOTAL 7.5   BILITOTAL 0.6   ALKPHOS 135   ALT 15   AST 31   TROPI 0.016[EB1.2]       Imaging:[EB1.1]  Recent Results (from the past 24 hour(s))   XR Chest 2 Views    Narrative    XR CHEST 2 VW 3/27/2018 10:02 AM    HISTORY: Cough.    COMPARISON: 2/28/2016.    FINDINGS: Multiple old right-sided rib injuries. No new consolidation,  pleural effusion or pneumothorax. Stable cardiomegaly.      Impression    IMPRESSION: No acute abnormality.    ROOPA FRANCISCO MD[EB1.2]          Revision History        User Key Date/Time User Provider Type Action    > EB1.3 3/27/2018 12:22 PM Jazmyn Boykin APRN CNP Nurse Practitioner Sign     EB1.4 3/27/2018 12:12 PM Jazmyn Boykin APRN CNP Nurse Practitioner      EB1.6 3/27/2018 11:40 AM Jazmyn Boykin APRN CNP Nurse Practitioner      EB1.5 3/27/2018 11:39 AM Jazmyn Boykin APRN CNP Nurse Practitioner      EB1.2 3/27/2018 11:29 AM Brokl, Jazmyn Padmini, APRN CNP Nurse Practitioner      EB1.1 3/27/2018 11:28  Jazmyn Kowalski, SATINDER CNP Nurse Practitioner                      Discharge Summaries      Discharge Summaries by Castillo Hummel MD at 3/30/2018  2:22 PM     Author:  Castillo Hummel MD Service:  Hospitalist Author Type:  Physician    Filed:  3/30/2018  2:33 PM Date of Service:  3/30/2018  2:22 PM Creation Time:  3/30/2018  2:22 PM    Status:  Signed :  Castillo Hummel MD (Physician)         Discharge Summary    Kylah Conroy MRN# [id number]   YOB: 1924 Age: 89 year old     Date of Admission:  3/27/2018  Date of Discharge:  3/30/2018  Admitting Physician:  Castillo Hummel MD  Discharge Physician:[MC1.1]  Castillo Hummel MD[MC1.2]  Discharging Service:  Hospitalist     Primary Provider: Toya Ley          Admission Diagnoses:[MC1.1]   Chronic atrial fibrillation (H) [I48.2]  Severe sepsis (H) [A41.9, R65.20]  Urinary tract infection with hematuria, site unspecified [N39.0, R31.9][MC1.2]          Discharge Diagnosis:   Patient Active Problem List   Diagnosis     GI bleeding     CHF (congestive heart failure) (H)     Sacral fracture (H)     ACP (advance care planning)     Cystitis             Condition on Discharge:       Discharge vitals:[MC1.1] Blood pressure 140/74, pulse 86, temperature 98.5  F (36.9  C), temperature source Oral, resp. rate 16, height 1.524 m (5'), weight 42.4 kg (93 lb 7.6 oz), SpO2 91 %.[MC1.2]         Gen: Thin, frail, kyphotic elderly female, alert and oriented x 3, no acute distressed  HEENT: Atraumatic, normocephalic  Lungs: Bilateral rhonchi without wheezes or rales  Heart: Irregular rate and rhythm, no murmurs, gallops, or rubs  GI: Bowel sound normal, no hepatosplenomegaly or masses  Lymph: No lymphadenopathy or edema  Skin: No rashes     # Discharge Pain Plan:   - Patient currently has NO PAIN and is not being prescribed pain medications on discharge.            Procedures / Labs / Imaging:   Most Recent 3 CBC's:[MC1.1]  Recent  Labs   Lab Test  03/30/18   0902  03/29/18   0752  03/28/18   0851   03/27/18   0934   WBC   --   6.0  12.0*   --   10.8   HGB   --   10.3*  11.1*   --   11.8   MCV   --   93  93   --   93   PLT  194  150  163   < >  181    < > = values in this interval not displayed.[MC1.2]      Most Recent 3 BMP's:[MC1.1]  Recent Labs   Lab Test  03/30/18   0902  03/29/18   0752  03/28/18   0851  03/27/18   0934   NA   --   140  138  134   POTASSIUM  3.8  3.8  3.8  4.0   CHLORIDE   --   109  105  99   CO2   --   25  26  26   BUN   --   10  15  16   CR   --   0.66  0.78  0.83   ANIONGAP   --   6  7  9   ELO   --   7.8*  7.9*  8.7   GLC   --   91  95  200*[MC1.2]     Most Recent 3 Troponin's:[MC1.1]  Recent Labs   Lab Test  03/27/18   0934  02/29/16   0651  02/29/16   0244   TROPI  0.016  0.342*  0.418*[MC1.2]     Most Recent 3 INR's:[MC1.1]  Recent Labs   Lab Test  03/27/18   0934  05/07/16   0927  03/07/11   1550   INR  1.09  1.11  1.09[MC1.2]     Most Recent 2 LFT's:[MC1.1]  Recent Labs   Lab Test  03/27/18   0934  02/24/16   2052   AST  31  31   ALT  15  26   ALKPHOS  135  119   BILITOTAL  0.6  0.8[MC1.2]     Most Recent Cholesterol Panel:[MC1.1]  Recent Labs   Lab Test  03/08/11   0710   CHOL  148   LDL  77   HDL  61   TRIG  54[MC1.2]     Most Recent 6 Bacteria Isolates From Any Culture (See EPIC Reports for Culture Details):[MC1.1]  Recent Labs   Lab Test  03/27/18   1010  03/27/18   0945  03/27/18   0934  02/28/16   1637   CULT  No growth after 3 days  >100,000 colonies/mL  Klebsiella pneumoniae  *  >100,000 colonies/mL  Strain 2  Klebsiella pneumoniae  *  No growth after 3 days  No growth[MC1.2]     Most Recent TSH, T4 and HgbA1c:[MC1.1]   Recent Labs   Lab Test  05/07/16   0927   A1C  5.7[MC1.2]     Results for orders placed or performed during the hospital encounter of 03/27/18   XR Chest 2 Views    Narrative    XR CHEST 2 VW 3/27/2018 10:02 AM    HISTORY: Cough.    COMPARISON: 2/28/2016.    FINDINGS: Multiple old  right-sided rib injuries. No new consolidation,  pleural effusion or pneumothorax. Stable cardiomegaly.      Impression    IMPRESSION: No acute abnormality.    ROOPA FRANCISCO MD             Medications Prior to Admission:[MC1.1]     Prescriptions Prior to Admission   Medication Sig Dispense Refill Last Dose     FUROSEMIDE PO Take 10 mg by mouth every morning   3/27/2018 at Unknown time     ACETAMINOPHEN PO Take 650 mg by mouth 2 times daily   3/27/2018 at Unknown time     DIPHENHYDRAMINE HCL PO Take 25 mg by mouth every 6 hours as needed   Past Week at Unknown time     senna-docusate (SENOKOT-S;PERICOLACE) 8.6-50 MG per tablet Take 1 tablet by mouth 2 times daily as needed for constipation   Past Month at Unknown time     pantoprazole (PROTONIX) 40 MG enteric coated tablet Take 1 tablet (40 mg) by mouth daily Take 30-60 minutes before a meal. (Patient taking differently: Take 40 mg by mouth daily Take 30-60 minutes before a meal.    Takes at 1600) 30 tablet 1 3/26/2018 at 1600     multivitamin, therapeutic (THERA-VIT) TABS Take 1 tablet by mouth daily   3/27/2018 at Unknown time[MC1.2]             Discharge Medications:     Current Discharge Medication List      START taking these medications    Details   azithromycin (ZITHROMAX) 250 MG tablet Take 1 tablet (250 mg) by mouth daily for 1 day  Qty: 1 tablet, Refills: 0    Associated Diagnoses: Acute bronchitis, unspecified organism      cefixime 200 MG/5ML SUSR Take 200 mg by mouth daily for 3 days  Qty: 15 mL, Refills: 0    Associated Diagnoses: Urinary tract infection with hematuria, site unspecified         CONTINUE these medications which have NOT CHANGED    Details   FUROSEMIDE PO Take 10 mg by mouth every morning      ACETAMINOPHEN PO Take 650 mg by mouth 2 times daily      DIPHENHYDRAMINE HCL PO Take 25 mg by mouth every 6 hours as needed      senna-docusate (SENOKOT-S;PERICOLACE) 8.6-50 MG per tablet Take 1 tablet by mouth 2 times daily as needed for  constipation      pantoprazole (PROTONIX) 40 MG enteric coated tablet Take 1 tablet (40 mg) by mouth daily Take 30-60 minutes before a meal.  Qty: 30 tablet, Refills: 1    Associated Diagnoses: Hematochezia      multivitamin, therapeutic (THERA-VIT) TABS Take 1 tablet by mouth daily                   Brief History of Illness:   Kylah Conroy is a 89 year old female who was admitted for altered mental status.          Hospital Course:   This is a 89-year-old female who was noted to have some confusion and slurred speech on day of discharge.  She is brought in the emergency room and found to have a urinary tract infection.  She was treated with Rocephin.  Urine cultures grew Klebsiella pneumoniae.  Lactate was elevated with 2.6.  Sepsis resolved with fluid resuscitation and follow-up lactate was normal at 1.8.  Patient was placed on telemetry due to history of atrial fibrillation.  She remained rate control throughout her hospitalization.  Patient was also noted to have a coarse and productive cough.  She was initiated on azithromycin for possible bronchitis.  Patient was gently fluid resuscitated with normal saline.  Patient's mentation cleared and her appetite improved.  She was discharged back to assisted living facility.    Greater than 35 minutes were spent in discharge planning.             Pending Results:[MC1.1]   Unresulted Labs Ordered in the Past 30 Days of this Admission     Date and Time Order Name Status Description    3/27/2018 0932 Blood culture Preliminary     3/27/2018 0932 Blood culture Preliminary[MC1.2]                 Revision History        User Key Date/Time User Provider Type Action    > MC1.2 3/30/2018  2:33 PM Castillo Hummel MD Physician Sign     MC1.1 3/30/2018  2:22 PM Castillo Hummel MD Physician                   Consult Notes     No notes of this type exist for this encounter.         Progress Notes - Physician (Notes from 03/27/18 through 03/30/18)      Progress Notes by  Snehal Cochran LSW at 3/30/2018  2:52 PM     Author:  Snehal Cochran LSW Service:  Social Work Author Type:      Filed:  3/30/2018  2:54 PM Date of Service:  3/30/2018  2:52 PM Creation Time:  3/30/2018  2:52 PM    Status:  Signed :  Snehal Cochran LSW ()         D: NORMAN following for DC planning.   I: Pt will DC today back to [facility]. HE BLS ride at 1530. PCS faxed. Orders faxed and Peter updated. Dtr here and nursing has updated her.    P: DC today.     MO Garcia, PAULINA  [phone][JJ1.1]       Revision History        User Key Date/Time User Provider Type Action    > JJ1.1 3/30/2018  2:54 PM Snehal Cochran LSW  Sign            Progress Notes by Snehal Cochran LSW at 3/29/2018  2:38 PM     Author:  Snehal Cochran LSW Service:  Social Work Author Type:      Filed:  3/29/2018  2:40 PM Date of Service:  3/29/2018  2:38 PM Creation Time:  3/29/2018  2:38 PM    Status:  Signed :  Snehal Cochran LSW ()         D: NORMAN following for DC planning. NORMAN reviewed chart. Pt is from [facility]. Possible DC Friday.   I: NORMAN spoke with VALERIE Rojas at [facility], [phone]. Fax [phone]. Updated him. Appears pt is at/close to baseline. Typically assist of one and uses a wheelchair. On Friday's, [facility] prefers pt's return by 1100.  A: Return to [place] where needs are met.   P: SW will follow.     MO Garcia, PAULINA  [phone][JJ1.1]       Revision History        User Key Date/Time User Provider Type Action    > JJ1.1 3/29/2018  2:40 PM Cochran, Snehal Alessia Thorp, LSW  Sign            Progress Notes by Castillo Hummel MD at 3/29/2018  2:07 PM     Author:  Castillo Hummel MD Service:  Hospitalist Author Type:  Physician    Filed:  3/29/2018  2:16 PM Date of Service:  3/29/2018  2:07 PM  Creation Time:  3/29/2018  2:07 PM    Status:  Signed :  Castillo Hummel MD (Physician)         [facility]    Hospitalist Progress Note    Date of Service (when I saw the patient): 03/29/2018    Assessment & Plan   Kylah Conroy is a 89 year old female who was admitted on 3/27/2018 with sepsis due to a UTI.    1. Sepsis due to UTI.  Continue Rocephin.  Urine cultures growing Klebsiella pneumoniae.  Sepsis resolved following fluid resuscitation.  Patient to remain in hospital due to poor appetite.    2. Bronchitis.  Continue azithromycin.    3. Chronic afib.  Rate controlled.  Discontinue telemetry.  Not on AC due to fall risk.    4. Metabolic encephalopathy.  Resolved.  Likely underlying mild dementia.    5. Hypovolemic hyponatremia.  Resolved with IVF.    6. Chronic diastolic heart failure.  Holding IVF.  Consider resuming diuretics tomorrow if appetite improves.  Monitor closely for volume overload.    # Pain Assessment:  Current Pain Score 3/29/2018   Patient currently in pain? denies   Pain score (0-10) -   Pain location -   Pain descriptors -   Kylah hernandez pain level was assessed and she currently denies pain.        DVT Prophylaxis: Pneumatic Compression Devices  Code Status: DNR/DNI    Disposition: Expected discharge in 1-2 days.    Castillo Hummel  Text Page (7 am to 6 pm)    Interval History   The patient resting in bed.  Appetite remains modest.  No abdominal pain, vomiting or diarrhea.    -Data reviewed today: I reviewed all new labs and imaging results over the last 24 hours. I personally reviewed no images or EKG's today.    Physical Exam   Temp: 97.8  F (36.6  C) Temp src: Oral BP: 131/80   Heart Rate: 76 Resp: 18 SpO2: 91 % O2 Device: None (Room air)    Vitals:    03/27/18 1300 03/28/18 0700 03/29/18 0700   Weight: 40.8 kg (89 lb 15.2 oz) 41.5 kg (91 lb 7.9 oz) 43.2 kg (95 lb 3.8 oz)     Vital Signs with Ranges  Temp:  [97.8  F (36.6  C)-98.4  F (36.9  C)] 97.8  F (36.6   C)  Heart Rate:  [76-82] 76  Resp:  [16-20] 18  BP: (112-131)/(69-85) 131/80  SpO2:  [91 %] 91 %  I/O last 3 completed shifts:  In: 2984 [P.O.:600; I.V.:2384]  Out: -     Gen: Thin, kyphotic elderly female, alert and oriented x 3, no acute distressed  HEENT: Atraumatic, normocephalic  Lungs: Bilateral rhonchi without wheezes or rales  Heart: Irregular rate and rhythm, no murmurs, gallops, or rubs  GI: Bowel sound normal, no hepatosplenomegaly or masses  Lymph: No lymphadenopathy or edema  Skin: No rashes     Medications       cefTRIAXone  2 g Intravenous Q24H     azithromycin  500 mg Intravenous Q24H     pantoprazole  40 mg Oral Daily       Data     Recent Labs  Lab 03/29/18  0752 03/28/18  0851 03/27/18  1420 03/27/18  0934   WBC 6.0 12.0*  --  10.8   HGB 10.3* 11.1*  --  11.8   MCV 93 93  --  93    163 165 181   INR  --   --   --  1.09    138  --  134   POTASSIUM 3.8 3.8  --  4.0   CHLORIDE 109 105  --  99   CO2 25 26  --  26   BUN 10 15  --  16   CR 0.66 0.78  --  0.83   ANIONGAP 6 7  --  9   ELO 7.8* 7.9*  --  8.7   GLC 91 95  --  200*   ALBUMIN  --   --   --  3.2*   PROTTOTAL  --   --   --  7.5   BILITOTAL  --   --   --  0.6   ALKPHOS  --   --   --  135   ALT  --   --   --  15   AST  --   --   --  31   TROPI  --   --   --  0.016       No results found for this or any previous visit (from the past 24 hour(s)).[MC1.1]       Revision History        User Key Date/Time User Provider Type Action    > MC1.1 3/29/2018  2:16 PM Castillo Hummel MD Physician Sign            Progress Notes by Castillo Hummel MD at 3/28/2018  2:43 PM     Author:  Castillo Hummel MD Service:  Hospitalist Author Type:  Physician    Filed:  3/28/2018  2:55 PM Date of Service:  3/28/2018  2:43 PM Creation Time:  3/28/2018  2:43 PM    Status:  Signed :  Castillo Hummel MD (Physician)         [facility]    Hospitalist Progress Note    Date of Service (when I saw the patient):  03/28/2018    Assessment & Plan   Kylah Conroy is a 89 year old female who was admitted on 3/27/2018 with sepsis due to a UTI.    1. Sepsis due to UTI.  Continue Rocephin.  Urine cultures growing LFGNR.  Sepsis resolved following fluid resuscitation.    2. Bronchitis.  Continue azithromycin.    3. Chronic afib.  Rate controlled.  Continue telemetry.  Not on AC due to fall risk.    4. Metabolic encephalopathy.  Improved this AM.  Likely underlying mild dementia.    5. Hypovolemic hyponatremia.  Resolved with IVF.    6. Chronic diastolic heart failure.  Holding diuretics.  Appears euvolemic.    # Pain Assessment:  Current Pain Score 3/28/2018   Patient currently in pain? denies   Pain score (0-10) -   Pain location -   Pain descriptors -   Kylah hernandez pain level was assessed and she currently denies pain.        DVT Prophylaxis: Pneumatic Compression Devices  Code Status: DNR/DNI    Disposition: Expected discharge in 1-2 days.    Castillo Hummel  Text Page (7 am to 6 pm)    Interval History   The patient sitting comfortably in her room, eating breakfast.  Family states mentation has improved.    -Data reviewed today: I reviewed all new labs and imaging results over the last 24 hours. I personally reviewed no images or EKG's today.    Physical Exam   Temp: 98.6  F (37  C) Temp src: Oral BP: 116/72 Pulse: 98 Heart Rate: 98 Resp: 17 SpO2: 93 % O2 Device: None (Room air)    Vitals:    03/27/18 0931 03/27/18 1300 03/28/18 0700   Weight: 40.8 kg (90 lb) 40.8 kg (89 lb 15.2 oz) 41.5 kg (91 lb 7.9 oz)     Vital Signs with Ranges  Temp:  [97.5  F (36.4  C)-98.6  F (37  C)] 98.6  F (37  C)  Pulse:  [75-98] 98  Heart Rate:  [75-98] 98  Resp:  [16-18] 17  BP: ()/(51-72) 116/72  SpO2:  [92 %-93 %] 93 %  I/O last 3 completed shifts:  In: 890 [P.O.:181; I.V.:709]  Out: -     Gen: Thin, kyphotic elderly female, alert and oriented x 3, no acute distressed  HEENT: Atraumatic, normocephalic  Lungs: Bilateral rhonchi without  wheezes or rales  Heart: Irregular rate and rhythm, no murmurs, gallops, or rubs  GI: Bowel sound normal, no hepatosplenomegaly or masses  Lymph: No lymphadenopathy or edema  Skin: No rashes     Medications     sodium chloride Stopped (03/27/18 1230)     sodium chloride 75 mL/hr at 03/28/18 1402       cefTRIAXone  2 g Intravenous Q24H     azithromycin  500 mg Intravenous Q24H     pantoprazole  40 mg Oral Daily       Data     Recent Labs  Lab 03/28/18  0851 03/27/18  1420 03/27/18  0934   WBC 12.0*  --  10.8   HGB 11.1*  --  11.8   MCV 93  --  93    165 181   INR  --   --  1.09     --  134   POTASSIUM 3.8  --  4.0   CHLORIDE 105  --  99   CO2 26  --  26   BUN 15  --  16   CR 0.78  --  0.83   ANIONGAP 7  --  9   ELO 7.9*  --  8.7   GLC 95  --  200*   ALBUMIN  --   --  3.2*   PROTTOTAL  --   --  7.5   BILITOTAL  --   --  0.6   ALKPHOS  --   --  135   ALT  --   --  15   AST  --   --  31   TROPI  --   --  0.016       No results found for this or any previous visit (from the past 24 hour(s)).[MC1.1]       Revision History        User Key Date/Time User Provider Type Action    > MC1.1 3/28/2018  2:55 PM Castillo Hummel MD Physician Sign            Progress Notes by Jesse Palmer PT at 3/28/2018  9:04 AM     Author:  Jesse Palmer PT Service:  Acute IP Rehab Author Type:  Physical Therapist    Filed:  3/28/2018  9:04 AM Date of Service:  3/28/2018  9:04 AM Creation Time:  3/28/2018  9:04 AM    Status:  Signed :  Jesse Palmer PT (Physical Therapist)            03/28/18 0800   Quick Adds   Type of Visit Initial PT Evaluation   Living Environment   Lives With facility resident   Living Arrangements assisted living   Living Environment Comment Per phone call with AL staff: Patient resides at [facility] in [place]. Walks with 1, GB and waklker. Assist with all ADLs and verbal cues for eating. Pt limited by baseline visual deficits.    Self-Care   Usual Activity Tolerance fair   Current Activity  Tolerance fair   Equipment Currently Used at Home walker, rolling   Functional Level Prior   Ambulation 3-->assistive equipment and person   Transferring 3-->assistive equipment and person   Toileting 3-->assistive equipment and person   Bathing 3-->assistive equipment and person   Dressing 2-->assistive person   Eating 2-->assistive person   Cognition 1 - attention or memory deficits  (Dementia per H&P)   General Information   Onset of Illness/Injury or Date of Surgery - Date 03/27/18   Patient/Family Goals Statement Not discussed   Pertinent History of Current Problem (include personal factors and/or comorbidities that impact the POC) 89 year old female admitted with sepsis, possible HAP vs. influenza.    Precautions/Limitations fall precautions   Cognitive Status Examination   Orientation person   Level of Consciousness alert   Follows Commands and Answers Questions able to follow single-step instructions  (Needs tactile cues for hand placements)   Memory impaired  (Baseline dementia)   Pain Assessment   Patient Currently in Pain No   Strength   Strength Comments Sufficient for mobility with CGA.    Bed Mobility   Bed Mobility Comments Supine to sit with CGA, verbal/tacitle cues to sequence.    Transfer Skills   Transfer Comments Sit to/from stand with CGA   Gait   Gait Comments Ambulates 30' with FWW and CGA/min A for walker directions.    Balance   Balance Comments Requires Ax1 and walker for safe mobility   Coordination   Coordination other (see comments)   Coordination Comments Difficulty reaching due to baseline visual deficits.    Clinical Impression   Criteria for Skilled Therapeutic Intervention evaluation only;no problems identified which require skilled intervention;current level of function same as previous level of function   Clinical Presentation Stable/Uncomplicated   Clinical Presentation Rationale Medically stbale   Clinical Decision Making (Complexity) Low complexity   Anticipated Discharge  "Disposition Home with Assist   Risk & Benefits of therapy have been explained Yes   Patient, Family & other staff in agreement with plan of care Yes   [facility] AM-PAC TM \"6 Clicks\"   2016, Trustees of [facility], under license to [employer].  All rights reserved.   6 Clicks Short Forms Basic Mobility Inpatient Short Form   [facility] AM-PAC  \"6 Clicks\" V.2 Basic Mobility Inpatient Short Form   1. Turning from your back to your side while in a flat bed without using bedrails? 3 - A Little   2. Moving from lying on your back to sitting on the side of a flat bed without using bedrails? 3 - A Little   3. Moving to and from a bed to a chair (including a wheelchair)? 3 - A Little   4. Standing up from a chair using your arms (e.g., wheelchair, or bedside chair)? 3 - A Little   5. To walk in hospital room? 3 - A Little   6. Climbing 3-5 steps with a railing? 2 - A Lot   Basic Mobility Raw Score (Score out of 24.Lower scores equate to lower levels of function) 17   Total Evaluation Time   Total Evaluation Time (Minutes) 16[BB1.1]        Revision History        User Key Date/Time User Provider Type Action    > BB1.1 3/28/2018  9:04 AM Jesse Palmer PT Physical Therapist Sign            Progress Notes by Jazmyn Boykin APRN CNP at 3/27/2018  3:54 PM     Author:  Jazmyn Boykin APRN CNP Service:  General Medicine Author Type:  Nurse Practitioner    Filed:  3/27/2018  3:56 PM Date of Service:  3/27/2018  3:54 PM Creation Time:  3/27/2018  3:54 PM    Status:  Signed :  Jazmyn Boykin APRN CNP (Nurse Practitioner)         Rec'd a call from RN staff that family requesting removal of heparin SQ from med list re: hx of bleeding, concern for falls.  Will d/c heparin and start bilat PCDs.     Jazmyn Boykin CNP  hospitalist NP  [phone][EB1.1]     Revision History        User Key Date/Time User Provider Type Action    > EB1.1 3/27/2018  3:56 PM Jazmyn Boykin APRN CNP Nurse Practitioner Sign "            Progress Notes by Fantasma Granado at 3/27/2018  3:41 PM     Author:  Fantasma Granado Service:  Spiritual Health Author Type:      Filed:  3/27/2018  3:45 PM Date of Service:  3/27/2018  3:41 PM Creation Time:  3/27/2018  3:41 PM    Status:  Signed :  Fantasma Granado ()         SPIRITUAL HEALTH SERVICES Progress Note  [facility]     SH, referral visit. The patient's nephew shared about the patient's brother who lives in the same care facility as the patient who is struggling with health and may well be short in days towards the end of life. The nephew shared this connection as a view that the patient may have declining health as well in keeping with her brother. The patient declined  for prayer. The nephew agree for the  to come back the next day.      provided care in presence and listening.     Struggling to cope with family member also in poor health, has support from nephew and other family members.       will follow and return on Wednesday.      Fantasma Granado  Chaplain Resident[DC1.1]     Revision History        User Key Date/Time User Provider Type Action    > DC1.1 3/27/2018  3:45 PM Fantasma Granado  Sign            ED Provider Notes by Mack Montelongo MD at 3/27/2018  9:26 AM     Author:  Mack Montelongo MD Service:  Emergency Medicine Author Type:  Physician    Filed:  3/27/2018  1:16 PM Date of Service:  3/27/2018  9:26 AM Creation Time:  3/27/2018  9:28 AM    Status:  Signed :  Mack Montelongo MD (Physician)           History     Chief Complaint:[GG1.1]  Cough[GG1.2]    HPI   Patient history limited secondary to poor historian. History obtained from EMS report and chart review.    Kylah Conroy is a 89 year old female with a history of dementia, CHF, and atrial fibrillation, not anticoagulated, who presents from nursing home via EMS for evaluation of a cough. Nursing home staff noticed that the patient was shaky, short of breath, and  had a cough this morning. They called medics and patient was brought to the ED by EMS for evaluation. Nursing home staff report she is at her baseline mental status. The patient denies any headache, abdominal pain, chest pain, or shortness of breath here, though[GG1.1] she[SL1.1] is an unreliable historian.    Allergies:  No Known Allergies     Medications:    Protonix  Senna  Lasix  Multivitamins    Past Medical History:    CHF  Atrial fibrillation  Rectal bleeding  Cerebral artery occlusion with cerebral infarction  Dementia     Past Surgical History:    Appendectomy     Family History:    History reviewed. No pertinent family history.     Social History:  Smoking status: Never smoker  Alcohol use: Yes, wine every night   The patient lives in a nursing home.  Marital Status:  Single [1]     Review of Systems   Constitutional:        Shaky   Respiratory: Positive for cough and shortness of breath.    Cardiovascular: Negative for chest pain.   Gastrointestinal: Negative for abdominal pain and vomiting.   Neurological: Negative for headaches.   All other systems reviewed and are negative.      Physical Exam[GG1.1]     Patient Vitals for the past 24 hrs:   BP Temp Temp src Pulse Heart Rate Resp SpO2 Weight   03/27/18 1021 129/64 - - - 106 28 95 % -   03/27/18 0945 118/85 - - - 114 (!) 33 92 % -   03/27/18 0931 145/85 100.4  F (38  C) Oral 92 - 20 92 % 40.8 kg (90 lb)[GG1.3]       Physical Exam  Nursing note and vitals reviewed.  Constitutional:  Awake, alert, and cooperative.   HENT:   Nose:    Nose normal.   Mouth/Throat:   Mucous membranes are normal.   Eyes:    Conjunctivae normal and EOM are normal.      Pupils are equal, round, and reactive to light.   Neck:    Trachea normal.   Cardiovascular:  Tachycardic, irregularly irregular rhythm, normal heart sounds and normal pulses. No murmur heard.  Pulmonary/Chest:  Effort normal. Rales in the bases, right more so than left.   Abdominal:   Soft. Normal appearance and  bowel sounds are normal.      There is no tenderness.      There is no rebound and no CVA tenderness.   Musculoskeletal:  Extremities atraumatic x 4.   Lymphadenopathy:  No cervical adenopathy.   Neurological:   Awake and alert but demented. Normal strength.      No cranial nerve deficit or sensory deficit. GCS eye subscore is 4. GCS verbal subscore is 5. GCS motor subscore is 6.   Skin:    Skin is intact. No rash noted.   Psychiatric:   Normal mood and affect.    Emergency Department Course   ECG (9:36:54):  Rate 94 bpm. OR interval *. QRS duration 74. QT/QTc 360/450. P-R-T axes * 7 268. Atrial fibrillation. Anteroseptal infarct, age undetermined. Abnormal ECG.   No significant changes when compared to ECG from 2/28/16  Interpreted at 0946 by Mack Montelongo MD.    Imaging:  Radiographic findings were communicated with the patient who voiced understanding of the findings.    XR Chest 2 views  No acute abnormality.  As read by Radiology.    Laboratory:  CBC: WNL (WBC 10.8, HGB 11.8, )[GG1.1]   CMP: Glucose 200(H), Albumin 3.2(L), o/w WNL (Creatinine 0.83)  Lactic acid: 2.8(H)[GG1.4]  Repeat lactic:[GG1.5] 2.6(H)[GG1.6]  Troponin: 0.016  BNP: 702[GG1.4]    UA: Blood moderate,[GG1.1] Protein albumin 30, Leukocyte esterase large, WBC >182(H), RBC 36(H), WBC clumps present, Bacteria many, o/w negative    INR: 1.09    Influenza: Negative    Blood cultures x2: in process  Urine culture: in process[GG1.4]    Interventions:  1004: Tylenol 650 mg oral  1020: Rocephin 2 g IV  1022: NS IV infusion 75 mL/hr     Emergency Department Course:  The patient arrived in the emergency department via EMS.  Past medical records, nursing notes, and vitals reviewed.  0927: I performed an exam of the patient and obtained history, as documented above.  IV inserted and blood drawn. The patient was placed on continuous cardiac monitoring and pulse oximetry.  ECG obtained, results above.[GG1.1]   The patient was sent for a chest  x-ray while in the emergency department, findings above.[GG1.4]    1001: Patient's niece arrived in the ED.    1022: I rechecked the patient. Explained findings to the patient and nieice. Discussed plan for admission with the patient's niece and she consents.[GG1.1]     1025: I spoke to Dr. Hummel of the hospitalist service who accepts the patient for admission.[GG1.4]     1239: Repeat perfusion exam.[GG1.6]     Findings and plan explained to the Patient who consents to admission.   Discussed the patient with Dr. Hummel, who will admit the patient to a [facility] bed for further monitoring, evaluation, and treatment.[GG1.5]     Impression & Plan      CMS Diagnoses:[GG1.1]   The patient has signs of Severe Sepsisas evidenced by:    1. 2 SIRS criteria, AND  2. Suspected infection, AND   3. Organ dysfunction: Lactic Acid > 1.9    Time severe sepsis diagnosis confirmed = 0952 as this was the time when Lactate resulted, and the level was > 1.9      3 Hour Severe Sepsis Bundle Completion:  1. Initial Lactic Acid Result:[GG1.4]   Recent Labs   Lab Test  03/27/18   0934   LACT  2.8*[GG1.7]     2. Blood Cultures before Antibiotics: Yes  3. Broad Spectrum Antibiotics Administered: Yes[GG1.4]     Anti-infectives (Future)    Start     Dose/Rate Route Frequency Ordered Stop    03/27/18 1016  cefTRIAXone (ROCEPHIN) 2 g vial to attach to  ml bag for ADULTS or NS 50 ml bag for PEDS      2 g  over 30 Minutes Intravenous ONCE 03/27/18 1015[GG1.7]          4. Full 30mL/kg bolus not administered due to history of CHF and atrial fibrillation, not on rate control medications.  Severe Sepsis reassessment:  1. Repeat Lactic Acid Level:[GG1.4] 2.6[GG1.6]  2.[GG1.4] MAP>65 after initial IVF bolus, will continue to monitor fluid status and vital signs  I attest to having performed a repeat sepsis exam and assessment of perfusion at 1238 and the results demonstrate no change.[GG1.6]    Medical Decision Making:[GG1.1]  This is a 89-year-old  female brought in by EMS from her nursing home due to a cough, shaking, and fever.  The patient is not a reliable historian due to her underlying dementia.  She had a low-grade fever here, and she was tachycardic.  However she is chronically in atrial fibrillation and is not on any rate control medications.  Her blood pressure was normal, and therefore she was not hypotensive.  I was concerned that she might have influenza, pneumonia, UTI, and or sepsis.  We proceeded with the above workup, including the blood work, EKG, chest x-ray, UA, and influenza swab.  Given her CHF history, as well as her age and the rapid atrial fibrillation, I am not comfortable providing her significant amounts of IV fluids.  She was provided IV ceftriaxone though placed on her urine results, which appeared to be the source of her infection.  She does meet criteria for severe sepsis, but again she is not in septic shock.[SL1.1] Her repeat lactic acid is about the same and her repeat perfusion exam is unchanged as well.[SL1.2]    Diagnosis:[GG1.1]    ICD-10-CM   1. Urinary tract infection with hematuria, site unspecified N39.0    R31.9   2. Severe sepsis (H) A41.9    R65.20   3. Chronic atrial fibrillation (H) I48.2[GG1.8]     Disposition:[GG1.1] Admitted under the care of Dr. Hummel[GG1.4]    Talia Huggins  3/27/2018    EMERGENCY DEPARTMENT    ITalia, am serving as a scribe at 9:27 AM on 3/27/2018 to document services personally performed by Mack Montelongo MD based on my observations and the provider's statements to me.[GG1.1]        Mack Montelongo MD  03/27/18 1316  [SL1.3]     Revision History        User Key Date/Time User Provider Type Action    > SL1.3 3/27/2018  1:16 PM Mack Montelongo MD Physician Sign     SL1.2 3/27/2018  1:15 PM Mack Montelongo MD Physician      GG1.6 3/27/2018 12:39 PM Talia Huggins Scribe Share     GG1.3 3/27/2018 10:37 AM Talia Huggins     SL1.1 3/27/2018 10:36 AM  Mack Montelongo MD Physician Share     GG1.5 3/27/2018 10:36 AM Huggins, Talia Scribe      GG1.8 3/27/2018 10:29 AM Huggins, Talia Scribe Share     GG1.7 3/27/2018 10:27 AM Huggins, Talia Scribe      GG1.4 3/27/2018 10:25 AM Huggins, Talia Scribe Share     GG1.2 3/27/2018 10:17 AM Huggins, Talia Scribe      GG1.1 3/27/2018  9:28 AM Talia Hugginsibe             Progress Notes by Enedelia Gutierrez RN at 3/27/2018 12:19 PM     Author:  Enedelia Gutierrez RN Service:  (none) Author Type:  Registered Nurse    Filed:  3/27/2018 12:19 PM Date of Service:  3/27/2018 12:19 PM Creation Time:  3/27/2018 12:19 PM    Status:  Signed :  Enedelia Gutierrez RN (Registered Nurse)         RECEIVING UNIT ED HANDOFF REVIEW    ED Nurse Handoff Report was reviewed by: Enedelia Gutierrez on March 27, 2018 at 12:19 PM[VM1.1]          Revision History        User Key Date/Time User Provider Type Action    > VM1.1 3/27/2018 12:19 PM Enedelia Gutierrez RN Registered Nurse Sign            ED Notes by Toya Gloria RN at 3/27/2018 10:29 AM     Author:  Toya Gloria RN Service:  Nursing Author Type:  Registered Nurse    Filed:  3/27/2018 12:10 PM Date of Service:  3/27/2018 10:29 AM Creation Time:  3/27/2018 10:37 AM    Status:  Addendum :  Toya Gloria RN (Registered Nurse)         [facility]  ED Nurse Handoff Report    ED Chief complaint: Cough (NH pt shakey and SOB today , incont urine, a fib 110-130 , baseline dementia )      ED Diagnosis:   Final diagnoses:   Urinary tract infection with hematuria, site unspecified   Severe sepsis (H)   Chronic atrial fibrillation (H)       Code Status: POLST     Allergies: No Known Allergies    Activity level - Baseline/Home:  Stand with Assist    Activity Level - Current:   Unable to Assess     Needed?: No    Isolation:[BM1.1] droplet per hospitalist group[BM1.2]   Infection: Not Applicable    Bariatric?: No    Vital Signs:   Vitals:    03/27/18 0931  03/27/18 0945 03/27/18 1021   BP: 145/85 118/85 129/64   Pulse: 92     Resp: 20 (!) 33 28   Temp: 100.4  F (38  C)     TempSrc: Oral     SpO2: 92% 92% 95%   Weight: 40.8 kg (90 lb)         Cardiac Rhythm: ,        Pain level:      Is this patient confused?: Yes     Patient Report: Initial Complaint: NH called 911 for Shakey and SOB,   Focused Assessment: At baseline dementia, cough, febrile , weak confused   Tests Performed: labs, cxr   Abnormal Results: urine, lactate   Treatments provided: rocephin, NS at 75 cc hour     Family Comments: martir, POA  Here     OBS brochure/video discussed/provided to patient: No    ED Medications:   Medications   cefTRIAXone (ROCEPHIN) 2 g vial to attach to  ml bag for ADULTS or NS 50 ml bag for PEDS (2 g Intravenous New Bag 3/27/18 1020)   sodium chloride 0.9% infusion ( Intravenous New Bag 3/27/18 1022)   acetaminophen (TYLENOL) tablet 650 mg (650 mg Oral Given 3/27/18 1004)       Drips infusing?:  Yes    For the majority of the shift this patient was Green.   Interventions performed were na.    Severe Sepsis OR Septic Shock Diagnosis Present:   Yes    Per the ED Provider, Time Zero for severe sepsis or septic shock is:      3 Hour Severe Sepsis Bundle Completion:  1. Initial Lactic Acid Result:[BM1.1]   Recent Labs   Lab Test  03/27/18   0934   LACT  2.8*[BM1.3]     2. Blood Cultures before Antibiotics: Yes  3. Broad Spectrum Antibiotics Administered:[BM1.1]     Anti-infectives (Future)    Start     Dose/Rate Route Frequency Ordered Stop    03/27/18 1016  cefTRIAXone (ROCEPHIN) 2 g vial to attach to  ml bag for ADULTS or NS 50 ml bag for PEDS      2 g  over 30 Minutes Intravenous ONCE 03/27/18 1015[BM1.3]          4. 100 ml of IV fluids have been given so far      6 Hour Severe Sepsis Bundle Completion:    1. Repeat Lactic Acid Level: Not drawn yet   2. Patient currently on Vasopressors =  No      ED NURSE PHONE NUMBER: [phone][BM1.1]          Revision History         User Key Date/Time User Provider Type Action    > BM1.2 3/27/2018 12:10 PM Toya Gloria, RN Registered Nurse Addend     BM1.3 3/27/2018 10:37 AM Toya Gloria RN Registered Nurse Sign     BM1.1 3/27/2018 10:29 AM Toya Gloria RN Registered Nurse             ED Notes signed by Beatriz Non-Provider at 3/27/2018  9:45 AM      Author:  Scan, Non-Provider Service:  (none) Author Type:  (none)    Filed:  3/27/2018  9:45 AM Date of Service:  3/27/2018  9:45 AM Creation Time:  3/27/2018  9:45 AM    Status:  Signed :  Beatriz Non-Provider     Scan on 3/27/2018  9:45 AM by Beatriz Non-Provider : [facility]          Revision History        User Key Date/Time User Provider Type Action    > [N/A] 3/27/2018  9:45 AM Scan, Non-Provider (none) Sign            ED Notes by Noel Pelayo RN at 3/27/2018  9:28 AM     Author:  Noel Pelayo, RN Service:  (none) Author Type:  Registered Nurse    Filed:  3/27/2018  9:28 AM Date of Service:  3/27/2018  9:28 AM Creation Time:  3/27/2018  9:28 AM    Status:  Signed :  Noel Pelayo RN (Registered Nurse)         Bed: ED19  Expected date:   Expected time:   Means of arrival:   Comments:  Enedelia - 89F weakness eta 0918     Revision History        User Key Date/Time User Provider Type Action    > RH1.1 3/27/2018  9:28 AM Noel Pelayo, RN Registered Nurse Sign                  Procedure Notes     No notes of this type exist for this encounter.         Progress Notes - Therapies (Notes from 03/27/18 through 03/30/18)      Progress Notes by Jesse Palmer PT at 3/28/2018  9:04 AM     Author:  Jesse Palmer PT Service:  Acute IP Rehab Author Type:  Physical Therapist    Filed:  3/28/2018  9:04 AM Date of Service:  3/28/2018  9:04 AM Creation Time:  3/28/2018  9:04 AM    Status:  Signed :  Jesse Palmer PT (Physical Therapist)            03/28/18 0800   Quick Adds   Type of Visit Initial PT Evaluation   Living Environment   Lives  With facility resident   Living Arrangements assisted living   Living Environment Comment Per phone call with AL staff: Patient resides at [facility] in [place]. Walks with 1, GB and waklker. Assist with all ADLs and verbal cues for eating. Pt limited by baseline visual deficits.    Self-Care   Usual Activity Tolerance fair   Current Activity Tolerance fair   Equipment Currently Used at Home walker, rolling   Functional Level Prior   Ambulation 3-->assistive equipment and person   Transferring 3-->assistive equipment and person   Toileting 3-->assistive equipment and person   Bathing 3-->assistive equipment and person   Dressing 2-->assistive person   Eating 2-->assistive person   Cognition 1 - attention or memory deficits  (Dementia per H&P)   General Information   Onset of Illness/Injury or Date of Surgery - Date 03/27/18   Patient/Family Goals Statement Not discussed   Pertinent History of Current Problem (include personal factors and/or comorbidities that impact the POC) 89 year old female admitted with sepsis, possible HAP vs. influenza.    Precautions/Limitations fall precautions   Cognitive Status Examination   Orientation person   Level of Consciousness alert   Follows Commands and Answers Questions able to follow single-step instructions  (Needs tactile cues for hand placements)   Memory impaired  (Baseline dementia)   Pain Assessment   Patient Currently in Pain No   Strength   Strength Comments Sufficient for mobility with CGA.    Bed Mobility   Bed Mobility Comments Supine to sit with CGA, verbal/tacitle cues to sequence.    Transfer Skills   Transfer Comments Sit to/from stand with CGA   Gait   Gait Comments Ambulates 30' with FWW and CGA/min A for walker directions.    Balance   Balance Comments Requires Ax1 and walker for safe mobility   Coordination   Coordination other (see comments)   Coordination Comments Difficulty reaching due to baseline visual deficits.    Clinical Impression   Criteria for  "Skilled Therapeutic Intervention evaluation only;no problems identified which require skilled intervention;current level of function same as previous level of function   Clinical Presentation Stable/Uncomplicated   Clinical Presentation Rationale Medically stbale   Clinical Decision Making (Complexity) Low complexity   Anticipated Discharge Disposition Home with Assist   Risk & Benefits of therapy have been explained Yes   Patient, Family & other staff in agreement with plan of care Yes   [facility] TM \"6 Clicks\"   2016, Trustees of [facility], under license to [employer].  All rights reserved.   6 Clicks Short Forms Basic Mobility Inpatient Short Form   [facility] AM-PAC  \"6 Clicks\" V.2 Basic Mobility Inpatient Short Form   1. Turning from your back to your side while in a flat bed without using bedrails? 3 - A Little   2. Moving from lying on your back to sitting on the side of a flat bed without using bedrails? 3 - A Little   3. Moving to and from a bed to a chair (including a wheelchair)? 3 - A Little   4. Standing up from a chair using your arms (e.g., wheelchair, or bedside chair)? 3 - A Little   5. To walk in hospital room? 3 - A Little   6. Climbing 3-5 steps with a railing? 2 - A Lot   Basic Mobility Raw Score (Score out of 24.Lower scores equate to lower levels of function) 17   Total Evaluation Time   Total Evaluation Time (Minutes) 16[BB1.1]        Revision History        User Key Date/Time User Provider Type Action    > BB1.1 3/28/2018  9:04 AM Jesse Palmer PT Physical Therapist Sign            "

## 2018-03-27 NOTE — ED NOTES
Bed: ED19  Expected date:   Expected time:   Means of arrival:   Comments:  Neisha LawrenceF weakness eta 0990

## 2018-03-27 NOTE — H&P
[facility]    History and Physical  Hospitalist       Date of Admission:  3/27/2018    Assessment & Plan   Kylah Conroy is a 89 year old female who presents with sepsis from urinary source, possible pulmonary source, possible change in speech.    Summary:      Sepsis from a urinary source with lactic acidosis  Continue ceftriaxone  Recheck lactic acid  Continue gentle volume expansion    Possible bronchitis versus HAP versus influenza  Check influenza and RSV PCR, continue droplet precautions.  Start empiric Tamiflu  add azithromycin   Consider checking a chest x-ray in the morning    A. fib chronic with RVR previously on digoxin but discontinued approximately 6 months ago  Telemetry monitoring  We will add as needed metoprolol IV for heart rate sustained greater than 120  We will check mag and phosphorus and start electrolyte replacement protocols, aiming to keep K greater than 4 and mag greater than 2    Likely dementia but without formal evaluation with possible new speech deficit/expressive aphasia per family but not appreciated on my exam  In discussing this with patient's niece who is her healthcare power of  she does not desire aggressive diagnostics such as MRI and/or neurologic consultation.  We discussed optimizing her metabolic parameters i.e. glucose sodium, treating active infection and reevaluate her mentation once those have been addressed.  We will need to remain vigilant for delirium she is certainly at high risk given her likely dementia, active infection, new environment, decreased visual acuity  Consult PT and activate fall precautions    Mild hyponatremia  Likely hypovolemic type  Gentle volume expansion with 0.9% saline  Recheck BMP in the morning    Hyperglycemia, likely stress-induced  We will check point-of-care glucose every shift    HFpEF with last echo in 2016 showing LVEF of 65%, increased LV filling pressures, severe biatrial enlargement, mild to moderate TR  and AR  Hold diuretics for now noting though that she may require brisk initiation and/or IV diuretics at some point as the A. fib and cystitis may likely precipitate an acute decompensation    DVT Prophylaxis: Heparin SQ  Code Status: DNR / DNI    Disposition: Expected discharge in her than equal to 2 midnight    Total time is 45 minutes from 1033 to 10:42 AM, 1048 -11:22 AM the latter of which was face-to-face time.  Remainder was spent in counseling coronation of care    SATINDER Monroy CNP    Primary Care Physician   Toya Ley MD    Chief Complaint   Cough likely sepsis from cystitis    History is obtained from the patient but primarily her niece Nuvia      History of Present Illness   Kylah Conroy is a 89 year old female with past medical history of atrial fibrillation without anticoagulation secondary to prior history of bleeding, heart failure NOS, dementia though not formally evaluated by neurology, ischemic stroke in 2011 without residual deficits.   resides at a nursing home.  On Sunday, 3/25/2018 patient was felt to be in her usual state of health as per her niece who is her healthcare power of .  In what has an apparent abrupt onset of illness yesterday patient felt very cold and had shaking chills despite 4 layers of blankets.  Staff at the nursing home reported a temperature of 98.4 but that was after Tylenol.  There is concern that she had an upper respiratory infection after recent ill contact with another loved one with a URI.  Today staff at the nursing home were unable to obtain vital signs at breakfast.  They also noted that she had a cough and was shaky.  EMS was contacted who transferred patient to [facility] emergency department.  In the Emergency department rapid influenza screen was negative she had a UA that was positive with large leukocyte esterase, WBCs greater than 182, bacteria and blood.  Urine culture is pending lactic acid was  elevated at 2.8 blood cultures were obtained.  Because of her history of heart failure she was not volume resuscitated and started on maintenance IV fluids.  She was given acetaminophen for temp of 100.4 started on ceftriaxone.  Chest x-ray showed some peripheral vascular congestion and rotation to the left but no acute infiltrates. ECG showed atrial fibrillation and possible inferolateral ST depression.  Because of concern for sepsis from a urinary source the hospitalist service was asked to admit her for further evaluation and management.   In speaking with patient's niece Nuvia she also noted that after lunchtime on 3/26/18 patient had difficulty with speech with difficulty getting the words out and she felt this was new.   Also in the last 24 hours her conversation is not making sense to the situation and she is not recognizing her niece.  We discussed patient's CODE STATUS and I reviewed her advanced directive.  Patient's niece reaffirms DNR/DNI.  After the exam and discussing overall aggressiveness of the evaluation during this hospitalization, in particular whether or not the family would value neurologic workup inclusive of MRI and neurology consult etc. martir Pedersen opted for gentle medical management without pursuing aggressive diagnostics.    Past Medical History    I have reviewed this patient's medical history and updated it with pertinent information if needed.   Past Medical History:   Diagnosis Date     Atrial fibrillation (H)      Rectal bleeding      Unspecified cerebral artery occlusion with cerebral infarction    HFpEF    Past Surgical History   I have reviewed this patient's surgical history and updated it with pertinent information if needed.  Past Surgical History:   Procedure Laterality Date     APPENDECTOMY         Prior to Admission Medications   Prior to Admission Medications   Prescriptions Last Dose Informant Patient Reported? Taking?   ACETAMINOPHEN PO 3/27/2018 at Unknown time Nursing  Home Yes Yes   Sig: Take 650 mg by mouth 2 times daily   DIPHENHYDRAMINE HCL PO Past Week at Unknown time Nursing Home Yes Yes   Sig: Take 25 mg by mouth every 6 hours as needed   FUROSEMIDE PO 3/27/2018 at Unknown time Nursing Home Yes Yes   Sig: Take 10 mg by mouth every morning   multivitamin, therapeutic (THERA-VIT) TABS 3/27/2018 at Unknown time Nursing Home Yes Yes   Sig: Take 1 tablet by mouth daily   pantoprazole (PROTONIX) 40 MG enteric coated tablet 3/26/2018 at 1600 Nursing Home No Yes   Sig: Take 1 tablet (40 mg) by mouth daily Take 30-60 minutes before a meal.   Patient taking differently: Take 40 mg by mouth daily Take 30-60 minutes before a meal.    Takes at 1600   senna-docusate (SENOKOT-S;PERICOLACE) 8.6-50 MG per tablet Past Month at Unknown time Nursing Home Yes Yes   Sig: Take 1 tablet by mouth 2 times daily as needed for constipation      Facility-Administered Medications: None     Allergies   No Known Allergies    Social History   I have reviewed this patient's social history and updated it with pertinent information if needed. Kylah ELIZABETH Conroy  reports that she has never smoked. She does not have any smokeless tobacco history on file. She reports that she drinks alcohol. She reports that she does not use illicit drugs.  She is single and resides at the [facility] in the [facility].   niece and nephews Nuvia Perry and Castillo all are her healthcare power of 's    Family History   I have reviewed this patient's family history and updated it with pertinent information if needed.   No family history on file.   Sister  of stroke, mother  of stroke age 95, father  of perforated ulcer with gangrene    Review of Systems   CONSTITUTIONAL: Fevers chills as above no recent hospitalizations meds are distributed by the nursing home staff.  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 has partial vision in her right eye and she had been treated by an ophthalmologist but at this  point is not pursuing further treatment  HEENT: Denies any sore throat or rhinorrhea  RESPIRATORY: Has had a cough ×1 day no mucus production no dyspnea on exertion  CARDIOVASCULAR: No chest pain heaviness tightness no lower extremity edema no orthopnea niece reports that within the last 6 months digoxin was discontinued  GASTROINTESTINAL: One day of anorexia did not eat dinner on 3/26/2018 out of staff concern for aspiration though she does not follow any special diet has no known history of dysphasia.  No nausea vomiting diarrhea constipation or abdominal pain  GENITOURINARY: No dysuria no malodorous urine no hematuria  INTEGUMENT no acute skin changes  ENDOCRINE: No heat or cold intolerance not known to be diabetic  MUSCULOSKELETAL: Has some chronic back pain which the niece attributes to a 2-year-old compression fracture no myalgias  NEUROLOGICAL:   Baseline patient ambulates with assistance of a gait belt and attendant and her walker.  She gets full care for dressing toileting bathing and feeding.  Baseline she is not oriented to place and in the last 24 hours her conversation is not making sense to the conversation she is not recognizing her niece just in the last 24 hours    Physical Exam   Temp: 100.4  F (38  C) Temp src: Oral BP: 129/64 Pulse: 92 Heart Rate: 106 Resp: 28 SpO2: 95 % O2 Device: None (Room air)    Vital Signs with Ranges  Temp:  [100.4  F (38  C)] 100.4  F (38  C)  Pulse:  [92] 92  Heart Rate:  [106-114] 106  Resp:  [20-33] 28  BP: (118-145)/(64-85) 129/64  SpO2:  [92 %-95 %] 95 %  90 lbs 0 oz    Constitutional: Awake, alert, cooperative, no apparent distress.  Eyes: Conjunctiva pink somewhat outwardly rotated lower lids,  pupils 3 mm and reactive bilaterally  HEENT: Tacky oral mucous membranes, normal dentition.,  No JVD  Respiratory: Clear to auscultation bilaterally upper lobes, rhonchi in the right lower lobe coughs frequently throughout the exam nonproductive  Cardiovascular: Irregularly  irregular rate and rhythm, normal S1 and S2, and no murmur noted.  GI: Soft, non-distended, non-tender, normal bowel sounds.,  No suprapubic tenderness  Skin: No rashes, no cyanosis, no edema.  Musculoskeletal: No joint swelling, erythema or tenderness.,  Chamberlain neck deformities of fingers with outward deviation  Neurologic: Follows commands to squeeze hands and wiggle feet bilaterally extremity strength is grossly 5/5×4 sensation grossly preserved speech is fluent tongue is midline face is symmetric.  Able to perform Romberg testing without difficulty, she declined alternating hand movements because she could not see what I was doing.  No pronator drift  Psychiatric: Alert, disoriented to person, place and time, no obvious anxiety or depression.    Data   Data reviewed today:  I personally reviewed the EKG tracing showing Described above.    Recent Labs  Lab 03/27/18  0934   WBC 10.8   HGB 11.8   MCV 93      INR 1.09      POTASSIUM 4.0   CHLORIDE 99   CO2 26   BUN 16   CR 0.83   ANIONGAP 9   ELO 8.7   *   ALBUMIN 3.2*   PROTTOTAL 7.5   BILITOTAL 0.6   ALKPHOS 135   ALT 15   AST 31   TROPI 0.016       Imaging:  Recent Results (from the past 24 hour(s))   XR Chest 2 Views    Narrative    XR CHEST 2 VW 3/27/2018 10:02 AM    HISTORY: Cough.    COMPARISON: 2/28/2016.    FINDINGS: Multiple old right-sided rib injuries. No new consolidation,  pleural effusion or pneumothorax. Stable cardiomegaly.      Impression    IMPRESSION: No acute abnormality.    ROOPA FRANCISCO MD

## 2018-03-27 NOTE — PLAN OF CARE
Problem: Patient Care Overview  Goal: Plan of Care/Patient Progress Review  Outcome: No Change  Transfer from ED @approx 1250, accompanied by family. Pt alert, disoriented to time and situation. Blind in L eye and diminished vision in R per family. Pleasant and cooperative. VSS on RA. Frequent, non-productive cough. Reports pain in L lower back with movement, otherwise denies at rest. LS diminished. Tele Afib, CVR, BBB, occasionally tachy (low 100s). IV Zithromax infusing on arrival, completed. IVF@75/hr. LA improved 1.8. Mg+1.4, replacement infusing. +ve UA in ER, UC pending. BC pending. CXR -ve. Soft heels, elevated on pillows. Incontinent of urine. Droplet Isolation maintained. Family remains at bedside.

## 2018-03-27 NOTE — IP AVS SNAPSHOT
` `     [place] [address] [facility]: [phone]            Medication Administration Report for Kylah Conroy as of 03/30/18 1517   Legend:    Given Hold Not Given Due Canceled Entry Other Actions    Time Time (Time) Time  Time-Action       Inactive    Active    Linked        Medications 03/24/18 03/25/18 03/26/18 03/27/18 03/28/18 03/29/18 03/30/18    acetaminophen (TYLENOL) tablet 650 mg  Dose: 650 mg  Freq: EVERY 4 HOURS PRN Route: PO  PRN Reason: mild pain  Start: 03/27/18 1255   Admin Instructions: Alternate ibuprofen (if ordered) with acetaminophen.  Maximum acetaminophen dose from all sources = 75 mg/kg/day not to exceed 4 grams/day.           1452 (650 mg)-Given           azithromycin (ZITHROMAX) 500 mg in sodium chloride 0.9 % 250 mL intermittent infusion  Dose: 500 mg  Freq: EVERY 24 HOURS Route: IV  Indications of Use: COMMUNITY ACQUIRED PNEUMONIA  Last Dose: 500 mg (03/30/18 1154)  Start: 03/27/18 1206       1228 (500 mg)-New Bag       1229-ED Infusing on Admission/transfer        1223 (500 mg)-New Bag        1217 (500 mg)-New Bag        1154 (500 mg)-New Bag           cefTRIAXone (ROCEPHIN) 2 g vial to attach to  ml bag for ADULTS or NS 50 ml bag for PEDS  Dose: 2 g  Freq: EVERY 24 HOURS Route: IV  Indications of Use: URINARY TRACT INFECTION  Start: 03/28/18 1000        0953 (2 g)-New Bag        0944 (2 g)-New Bag        1001 (2 g)-New Bag           magnesium sulfate 4 g in 100 mL sterile water (premade)  Dose: 4 g  Freq: EVERY 4 HOURS PRN Route: IV  PRN Reason: magnesium supplementation  Start: 03/29/18 1218   Admin Instructions: For serum Mg++ less than 1.6 mg/dL  Give 4 g and recheck magnesium level 2 hours after dose, and next AM.               metoprolol (LOPRESSOR) injection 2.5 mg  Dose: 2.5 mg  Freq: EVERY 4 HOURS PRN Route: IV  PRN Reason: other  PRN Comment: for HR > 120 sustained, hold for SBP < 90  Start: 03/27/18 1255   Admin Instructions: For ordered doses up to  15 mg, give IV Push undiluted over 5-10 minutes.               naloxone (NARCAN) injection 0.1-0.4 mg  Dose: 0.1-0.4 mg  Freq: EVERY 2 MIN PRN Route: IV  PRN Reason: opioid reversal  Start: 03/27/18 1255   Admin Instructions: For respiratory rate LESS than or EQUAL to 8.  Partial reversal dose:  0.1 mg titrated q 2 minutes for Analgesia Side Effects Monitoring Sedation Level of 3 (frequently drowsy, arousable, drifts to sleep during conversation).Full reversal dose:  0.4 mg bolus for Analgesia Side Effects Monitoring Sedation Level of 4 (somnolent, minimal or no response to stimulation).  For ordered doses up to 2mg give IVP. Give each 0.4mg over 15 seconds in emergency situations. For non-emergent situations further dilute in 9mL of NS to facilitate titration of response.               pantoprazole (PROTONIX) EC tablet 40 mg  Dose: 40 mg  Freq: DAILY Route: PO  Start: 03/27/18 1300   Admin Instructions: DO NOT CRUSH.        1354 (40 mg)-Given        0853 (40 mg)-Given        0944 (40 mg)-Given        0822 (40 mg)-Given           potassium chloride (KLOR-CON) Packet 20-40 mEq  Dose: 20-40 mEq  Freq: EVERY 2 HOURS PRN Route: ORAL OR FEED  PRN Reason: potassium supplementation  Start: 03/29/18 1218   Admin Instructions: Use if unable to tolerate tablets.  If Serum K+ 3.0-3.3, dose = 60 mEq po total dose (40 mEq x1 followed in 2 hours by 20 mEq x1). Recheck K+ level 4 hours after dose and the next AM.  If Serum K+ 2.5-2.9, dose = 80 mEq po total dose (40 mEq Q2H x2). Recheck K+ level 4 hours after dose and the next AM.  If Serum K+ less than 2.5, See IV order.  Dissolve packet contents in 4-8 ounces of cold water or juice.               potassium chloride 10 mEq in 100 mL intermittent infusion with 10 mg lidocaine  Dose: 10 mEq  Freq: EVERY 1 HOUR PRN Route: IV  PRN Reason: potassium supplementation  Start: 03/29/18 1218   Admin Instructions: Infuse via PERIPHERAL LINE. Use potassium with lidocaine for pain with  peripheral administration.  If Serum K+ 3.0-3.3, dose = 10 mEq/hr x4 doses (40 mEq IV total dose). Recheck K+ level 2 hours after dose and the next AM.  If Serum K+ less than 3.0, dose = 10 mEq/hr x6 doses (60 mEq IV total dose). Recheck K+ level 2 hours after dose and the next AM.               potassium chloride 10 mEq in 100 mL sterile water intermittent infusion (premix)  Dose: 10 mEq  Freq: EVERY 1 HOUR PRN Route: IV  PRN Reason: potassium supplementation  Start: 03/29/18 1218   Admin Instructions: Infuse via PERIPHERAL LINE or CENTRAL LINE. Use for central line replacement if patient weight less than 65 kg, if patient is on TPN with high potassium content or if unit does not stock 20 mEq bags.   If Serum K+ 3.0-3.3, dose = 10 mEq/hr x4 doses (40 mEq IV total dose). Recheck K+ level 2 hours after dose and the next AM.   If Serum K+ less than 3.0, dose = 10 mEq/hr x6 doses (60 mEq IV total dose). Recheck K+ level 2 hours after dose and the next AM.               potassium chloride 20 mEq in 50 mL intermittent infusion  Dose: 20 mEq  Freq: EVERY 1 HOUR PRN Route: IV  PRN Reason: potassium supplementation  Start: 03/29/18 1218   Admin Instructions: Infuse via CENTRAL LINE Only. May need EKG if less than 65 kg or on TPN - Max rate is 0.3 mEq/kg/hr for patients not on EKG monitoring.   If Serum K+ 3.0-3.3, dose = 20 mEq/hr x2 doses (40 mEq IV total dose). Recheck K+ level 2 hours after dose and the next AM.  If Serum K+ less than 3.0, dose = 20 mEq/hr x3 doses (60 mEq IV total dose). Recheck K+ level 2 hours after dose and the next AM.               potassium chloride SA (K-DUR/KLOR-CON M) CR tablet 20-40 mEq  Dose: 20-40 mEq  Freq: EVERY 2 HOURS PRN Route: PO  PRN Reason: potassium supplementation  Start: 03/29/18 1218   Admin Instructions: Use if able to take PO.   If Serum K+ 3.0-3.3, dose = 60 mEq po total dose (40 mEq x1 followed in 2 hours by 20 mEq x1). Recheck K+ level 4 hours after dose and the next AM.  If  Serum K+ 2.5-2.9, dose = 80 mEq po total dose (40 mEq Q2H x2). Recheck K+ level 4 hours after dose and the next AM.  If Serum K+ less than 2.5, See IV order.  DO NOT CRUSH               potassium phosphate 15 mmol in D5W 250 mL intermittent infusion  Dose: 15 mmol  Freq: DAILY PRN Route: IV  PRN Reason: phosphorous supplementation  Start: 03/29/18 1218   Admin Instructions: For serum phosphorus level 2-2.4  Do not infuse Phosphorus in the same line as TPN.   Give 15 mmol and recheck phosphorus level next AM.  Each mmol of phosphate provides 1.47 mEq of Potassium. Multiply the patient's phosphate dose by 1.47 to determine the amount of potassium in this dose.          1457 (15 mmol)-New Bag            potassium phosphate 20 mmol in D5W 250 mL intermittent infusion  Dose: 20 mmol  Freq: EVERY 6 HOURS PRN Route: IV  PRN Reason: phosphorous supplementation  Start: 03/29/18 1218   Admin Instructions: For serum phosphorus level 1.1-1.9  For CENTRAL Line ONLY  Do not infuse Phosphorus in the same line as TPN.   Give 20 mmol and recheck phosphorus level 2 hours after last dose and next AM.  Each mmol of phosphate provides 1.47 mEq of Potassium. Multiply the patient's phosphate dose by 1.47 to determine the amount of potassium in this dose.               potassium phosphate 20 mmol in D5W 500 mL intermittent infusion  Dose: 20 mmol  Freq: EVERY 6 HOURS PRN Route: IV  PRN Reason: phosphorous supplementation  Start: 03/29/18 1218   Admin Instructions: For serum phosphorus level 1.1-1.9  For Peripheral Line  Do not infuse Phosphorus in the same line as TPN.   Give 20 mmol and recheck phosphorus level 2 hours after last dose and next AM.  Each mmol of phosphate provides 1.47 mEq of Potassium. Multiply the patient's phosphate dose by 1.47 to determine the amount of potassium in this dose.               potassium phosphate 25 mmol in D5W 500 mL intermittent infusion  Dose: 25 mmol  Freq: EVERY 8 HOURS PRN Route: IV  PRN Reason:  phosphorous supplementation  Start: 03/29/18 1218   Admin Instructions: For serum phosphorus level less than 1.1  Do not infuse Phosphorus in the same line as TPN.   Give 25 mmol and recheck phosphorus level 2 hours after last dose and next AM.  Each mmol of phosphate provides 1.47 mEq of Potassium. Multiply the patient's phosphate dose by 1.47 to determine the amount of potassium in this dose.               senna-docusate (SENOKOT-S;PERICOLACE) 8.6-50 MG per tablet 1 tablet  Dose: 1 tablet  Freq: 2 TIMES DAILY PRN Route: PO  PRN Reason: constipation  Start: 03/27/18 1255             Discontinued Medications  Medications 03/24/18 03/25/18 03/26/18 03/27/18 03/28/18 03/29/18 03/30/18         Dose: 5,000 Units  Freq: EVERY 12 HOURS Route: SC  Start: 03/27/18 1300   End: 03/27/18 1551   Admin Instructions: HOLD heparin IF platelet count falls below 50% baseline or less than 100,000 /  L and notify provider. Use this product If CrCl less than 30 mL/min.  High concentration heparin. Not for line flush or cath care.        1354 (5,000 Units)-Given       1551-Med Discontinued            Dose: 2 g  Freq: DAILY PRN Route: IV  PRN Reason: magnesium supplementation  Start: 03/27/18 1255   End: 03/29/18 1218   Admin Instructions: For Serum Mg++ 1.6 - 2 mg/dL  Give 2 g and recheck magnesium level next AM.          1058 (2 g)-New Bag       1218-Med Discontinued          Dose: 4 g  Freq: EVERY 4 HOURS PRN Route: IV  PRN Reason: magnesium supplementation  Start: 03/27/18 1255   End: 03/29/18 1218   Admin Instructions: For serum Mg++ less than 1.6 mg/dL  Give 4 g and recheck magnesium level 2 hours after dose, and next AM.          1218-Med Discontinued          Dose: 30 mg  Freq: DAILY Route: PO  Indications Comment: Influenza Prophylaxis, if PCR swab is neg, call hospitalist to discontinue  Start: 03/27/18 1315   End: 03/28/18 0906   Admin Instructions: Dose adjusted per renal dosing policy.  Estimated CrCl = 27 mL/min.         1354 (30 mg)-Given        0906-Med Discontinued           Dose: 20-40 mEq  Freq: EVERY 2 HOURS PRN Route: ORAL OR FEED  PRN Reason: potassium supplementation  Start: 03/27/18 1255   End: 03/29/18 1218   Admin Instructions: Use if unable to tolerate tablets.    If Serum K+ 3.4-4.0, dose = 20 mEq x1. Recheck K+ level the next AM.  If Serum K+ 3.0-3.3, dose = 60 mEq po total dose (40 mEq x 1 followed in 2 hours by 20 mEq X1). Recheck K+ level 4 hours after dose and the next AM.  If Serum K+ 2.5-2.9, dose = 80 mEq po total dose (40 mEq Q2H x2). Recheck K+ level 4 hours after dose and the next AM.  If Serum K+ less than 2.5, See IV order.  Dissolve packet contents in 4-8 ounces of cold water or juice.          1218-Med Discontinued          Dose: 10 mEq  Freq: EVERY 1 HOUR PRN Route: IV  PRN Reason: potassium supplementation  Start: 03/27/18 1255   End: 03/29/18 1218   Admin Instructions: Infuse via PERIPHERAL LINE. Use potassium with lidocaine for pain with peripheral administration.  If Serum K+ 3.4-4.0, dose = 10 mEq/hr x2 doses. Recheck K+ level the next AM.  If Serum K+ 3.0-3.3, dose = 10 mEq/hr x4 doses (40 mEq IV total dose). Recheck K+ level 2 hours after dose and the next AM.  If Serum K+ less than 3.0, dose = 10 mEq/hr x6 doses (60 mEq IV total dose). Recheck K+ level 2 hours after dose and the next AM.          1218-Med Discontinued          Dose: 10 mEq  Freq: EVERY 1 HOUR PRN Route: IV  PRN Reason: potassium supplementation  Start: 03/27/18 1255   End: 03/29/18 1218   Admin Instructions: Infuse via PERIPHERAL LINE or CENTRAL LINE. Use for central line replacement if patient weight less than 65 kg, if patient is on TPN with high potassium content or if unit does not stock 20 mEq bags.  If Serum K+ 3.4-4.0, dose = 10 mEq/hr x2 doses. Recheck K+ level the next AM.  If Serum K+ 3.0-3.3, dose = 10 mEq/hr x4 doses (40 mEq IV total dose). Recheck K+ level 2 hours after dose and the next AM.  If Serum K+ less than  3.0, dose = 10 mEq/hr x6 doses (60 mEq IV total dose). Recheck K+ level 2 hours after dose and the next AM.          1218-Med Discontinued          Dose: 20 mEq  Freq: EVERY 1 HOUR PRN Route: IV  PRN Reason: potassium supplementation  Start: 03/27/18 1255   End: 03/29/18 1218   Admin Instructions: Infuse via CENTRAL LINE Only.  May need EKG if less than 65 kg or on TPN - Max rate is 0.3 mEq/kg/hr for patients not on EKG monitoring.    If Serum K+ 3.4-4.0, dose = 20 mEq/hr x1 doses. Recheck K+ level the next AM.  If Serum K+ 3.0-3.3, dose = 20 mEq/hr x2 doses (40 mEq IV total dose).  Recheck K+ level 2 hours after dose and the next AM.  If Serum K+ less than 3.0, dose = 20 mEq/hr x3 doses (60 mEq IV total dose). Recheck K+ level 2 hours after dose and the next AM.          1218-Med Discontinued          Dose: 20-40 mEq  Freq: EVERY 2 HOURS PRN Route: PO  PRN Reason: potassium supplementation  Start: 03/27/18 1255   End: 03/29/18 1218   Admin Instructions: Use if able to take PO.   If Serum K+ 3.4-4.0, dose = 20 mEq x1. Recheck K+ level the next AM.  If Serum K+ 3.0-3.3, dose = 60 mEq po total dose (40 mEq x1 followed in 2 hours by 20 mEq x1). Recheck K+ level 4 hours after dose and the next AM.  If Serum K+ 2.5-2.9, dose = 80 mEq po total dose (40 mEq Q2H x2). Recheck K+ level 4 hours after dose and the next AM.  If Serum K+ less than 2.5, See IV order.  DO NOT CRUSH        1825 (20 mEq)-Given        1458 (20 mEq)-Given [C]        0944 (20 mEq)-Given       1218-Med Discontinued          Dose: 10 mmol  Freq: DAILY PRN Route: IV  PRN Reason: phosphorous supplementation  Start: 03/27/18 1255   End: 03/29/18 1218   Admin Instructions: For serum phosphorus level 2.5-2.7  Do not infuse Phosphorus in the same line as TPN.   Give 10 mmol and recheck phosphorus level the next AM.  Each mmol of phosphate provides 1.47 mEq of Potassium. Multiply the patient's phosphate dose by 1.47 to determine the amount of potassium in this  dose.        1817 (10 mmol)-New Bag         1218-Med Discontinued          Dose: 15 mmol  Freq: DAILY PRN Route: IV  PRN Reason: phosphorous supplementation  Start: 03/27/18 1255   End: 03/29/18 1218   Admin Instructions: For serum phosphorus level 2.0-2.4  Do not infuse Phosphorus in the same line as TPN.   Give 15 mmol and recheck phosphorus level next AM.  Each mmol of phosphate provides 1.47 mEq of Potassium. Multiply the patient's phosphate dose by 1.47 to determine the amount of potassium in this dose.         1403 (15 mmol)-New Bag        1218-Med Discontinued          Dose: 20 mmol  Freq: EVERY 6 HOURS PRN Route: IV  PRN Reason: phosphorous supplementation  Start: 03/27/18 1255   End: 03/29/18 1218   Admin Instructions: For serum phosphorus level 1.1-1.9  For CENTRAL Line ONLY  Do not infuse Phosphorus in the same line as TPN.   Give 20 mmol and recheck phosphorus level 2 hours after dose and next AM.  Each mmol of phosphate provides 1.47 mEq of Potassium. Multiply the patient's phosphate dose by 1.47 to determine the amount of potassium in this dose.          1218-Med Discontinued          Dose: 20 mmol  Freq: EVERY 6 HOURS PRN Route: IV  PRN Reason: phosphorous supplementation  Start: 03/27/18 1255   End: 03/29/18 1218   Admin Instructions: For serum phosphorus level 1.1-1.9  For peripheral line  Do not infuse Phosphorus in the same line as TPN.   Give 20 mmol and recheck phosphorus level 2 hours after dose and next AM. Repeat if necessary.  Each mmol of phosphate provides 1.47 mEq of Potassium. Multiply the patient's phosphate dose by 1.47 to determine the amount of potassium in this dose.          1218-Med Discontinued          Dose: 25 mmol  Freq: EVERY 8 HOURS PRN Route: IV  PRN Reason: phosphorous supplementation  Start: 03/27/18 1255   End: 03/29/18 1218   Admin Instructions: For serum phosphorus level less than 1.1  Do not infuse Phosphorus in the same line as TPN.   Give 25 mmol and recheck  phosphorus level 2 hours after dose and next AM.  Each mmol of phosphate provides 1.47 mEq of Potassium. Multiply the patient's phosphate dose by 1.47 to determine the amount of potassium in this dose.          1218-Med Discontinued          Rate: 75 mL/hr   Freq: CONTINUOUS Route: IV  Last Dose: Stopped (03/29/18 0849)  Start: 03/27/18 1300   End: 03/29/18 0846       1354 ( )-Rate/Dose Verify       2352 ( )-New Bag        1402 ( )-New Bag        0315 ( )-New Bag       0846-Med Discontinued  0849-Stopped              Rate: 75 mL/hr   Freq: CONTINUOUS Route: IV  Last Dose: Stopped (03/27/18 1230)  Start: 03/27/18 1021   End: 03/29/18 0846       1022 ( )-New Bag       1230-ED Infusing on Admission/transfer         0846-Med Discontinued  0849-Stopped         Medications 03/24/18 03/25/18 03/26/18 03/27/18 03/28/18 03/29/18 03/30/18

## 2018-03-27 NOTE — ED PROVIDER NOTES
History     Chief Complaint:  Cough    HPI   Patient history limited secondary to poor historian. History obtained from EMS report and chart review.    Kylah Conroy is a 89 year old female with a history of dementia, CHF, and atrial fibrillation, not anticoagulated, who presents from nursing home via EMS for evaluation of a cough. Nursing home staff noticed that the patient was shaky, short of breath, and had a cough this morning. They called medics and patient was brought to the ED by EMS for evaluation. Nursing home staff report she is at her baseline mental status. The patient denies any headache, abdominal pain, chest pain, or shortness of breath here, though she is an unreliable historian.    Allergies:  No Known Allergies     Medications:    Protonix  Senna  Lasix  Multivitamins    Past Medical History:    CHF  Atrial fibrillation  Rectal bleeding  Cerebral artery occlusion with cerebral infarction  Dementia     Past Surgical History:    Appendectomy     Family History:    History reviewed. No pertinent family history.     Social History:  Smoking status: Never smoker  Alcohol use: Yes, wine every night   The patient lives in a nursing home.  Marital Status:  Single [1]     Review of Systems   Constitutional:        Shaky   Respiratory: Positive for cough and shortness of breath.    Cardiovascular: Negative for chest pain.   Gastrointestinal: Negative for abdominal pain and vomiting.   Neurological: Negative for headaches.   All other systems reviewed and are negative.      Physical Exam     Patient Vitals for the past 24 hrs:   BP Temp Temp src Pulse Heart Rate Resp SpO2 Weight   03/27/18 1021 129/64 - - - 106 28 95 % -   03/27/18 0945 118/85 - - - 114 (!) 33 92 % -   03/27/18 0931 145/85 100.4  F (38  C) Oral 92 - 20 92 % 40.8 kg (90 lb)       Physical Exam  Nursing note and vitals reviewed.  Constitutional:  Awake, alert, and cooperative.   HENT:   Nose:    Nose normal.   Mouth/Throat:   Mucous  membranes are normal.   Eyes:    Conjunctivae normal and EOM are normal.      Pupils are equal, round, and reactive to light.   Neck:    Trachea normal.   Cardiovascular:  Tachycardic, irregularly irregular rhythm, normal heart sounds and normal pulses. No murmur heard.  Pulmonary/Chest:  Effort normal. Rales in the bases, right more so than left.   Abdominal:   Soft. Normal appearance and bowel sounds are normal.      There is no tenderness.      There is no rebound and no CVA tenderness.   Musculoskeletal:  Extremities atraumatic x 4.   Lymphadenopathy:  No cervical adenopathy.   Neurological:   Awake and alert but demented. Normal strength.      No cranial nerve deficit or sensory deficit. GCS eye subscore is 4. GCS verbal subscore is 5. GCS motor subscore is 6.   Skin:    Skin is intact. No rash noted.   Psychiatric:   Normal mood and affect.    Emergency Department Course   ECG (9:36:54):  Rate 94 bpm. KY interval *. QRS duration 74. QT/QTc 360/450. P-R-T axes * 7 268. Atrial fibrillation. Anteroseptal infarct, age undetermined. Abnormal ECG.   No significant changes when compared to ECG from 2/28/16  Interpreted at 0946 by Mack Montelongo MD.    Imaging:  Radiographic findings were communicated with the patient who voiced understanding of the findings.    XR Chest 2 views  No acute abnormality.  As read by Radiology.    Laboratory:  CBC: WNL (WBC 10.8, HGB 11.8, )   CMP: Glucose 200(H), Albumin 3.2(L), o/w WNL (Creatinine 0.83)  Lactic acid: 2.8(H)  Repeat lactic: 2.6(H)  Troponin: 0.016  BNP: 702    UA: Blood moderate, Protein albumin 30, Leukocyte esterase large, WBC >182(H), RBC 36(H), WBC clumps present, Bacteria many, o/w negative    INR: 1.09    Influenza: Negative    Blood cultures x2: in process  Urine culture: in process    Interventions:  1004: Tylenol 650 mg oral  1020: Rocephin 2 g IV  1022: NS IV infusion 75 mL/hr     Emergency Department Course:  The patient arrived in the emergency  department via EMS.  Past medical records, nursing notes, and vitals reviewed.  0927: I performed an exam of the patient and obtained history, as documented above.  IV inserted and blood drawn. The patient was placed on continuous cardiac monitoring and pulse oximetry.  ECG obtained, results above.   The patient was sent for a chest x-ray while in the emergency department, findings above.    1001: Patient's niece arrived in the ED.    1022: I rechecked the patient. Explained findings to the patient and nieice. Discussed plan for admission with the patient's niece and she consents.     1025: I spoke to Dr. Hummel of the hospitalist service who accepts the patient for admission.     1239: Repeat perfusion exam.     Findings and plan explained to the Patient who consents to admission.   Discussed the patient with Dr. Hummel, who will admit the patient to a [facility] bed for further monitoring, evaluation, and treatment.     Impression & Plan      CMS Diagnoses:   The patient has signs of Severe Sepsisas evidenced by:    1. 2 SIRS criteria, AND  2. Suspected infection, AND   3. Organ dysfunction: Lactic Acid > 1.9    Time severe sepsis diagnosis confirmed = 0952 as this was the time when Lactate resulted, and the level was > 1.9      3 Hour Severe Sepsis Bundle Completion:  1. Initial Lactic Acid Result:   Recent Labs   Lab Test  03/27/18   0934   LACT  2.8*     2. Blood Cultures before Antibiotics: Yes  3. Broad Spectrum Antibiotics Administered: Yes     Anti-infectives (Future)    Start     Dose/Rate Route Frequency Ordered Stop    03/27/18 1016  cefTRIAXone (ROCEPHIN) 2 g vial to attach to  ml bag for ADULTS or NS 50 ml bag for PEDS      2 g  over 30 Minutes Intravenous ONCE 03/27/18 1015          4. Full 30mL/kg bolus not administered due to history of CHF and atrial fibrillation, not on rate control medications.  Severe Sepsis reassessment:  1. Repeat Lactic Acid Level: 2.6  2. MAP>65 after initial IVF bolus,  will continue to monitor fluid status and vital signs  I attest to having performed a repeat sepsis exam and assessment of perfusion at 1238 and the results demonstrate no change.    Medical Decision Making:  This is a 89-year-old female brought in by EMS from her nursing home due to a cough, shaking, and fever.  The patient is not a reliable historian due to her underlying dementia.  She had a low-grade fever here, and she was tachycardic.  However she is chronically in atrial fibrillation and is not on any rate control medications.  Her blood pressure was normal, and therefore she was not hypotensive.  I was concerned that she might have influenza, pneumonia, UTI, and or sepsis.  We proceeded with the above workup, including the blood work, EKG, chest x-ray, UA, and influenza swab.  Given her CHF history, as well as her age and the rapid atrial fibrillation, I am not comfortable providing her significant amounts of IV fluids.  She was provided IV ceftriaxone though placed on her urine results, which appeared to be the source of her infection.  She does meet criteria for severe sepsis, but again she is not in septic shock. Her repeat lactic acid is about the same and her repeat perfusion exam is unchanged as well.    Diagnosis:    ICD-10-CM   1. Urinary tract infection with hematuria, site unspecified N39.0    R31.9   2. Severe sepsis (H) A41.9    R65.20   3. Chronic atrial fibrillation (H) I48.2     Disposition: Admitted under the care of Dr. Estephanie Huggins  3/27/2018    EMERGENCY DEPARTMENT    ITalia, am serving as a scribe at 9:27 AM on 3/27/2018 to document services personally performed by Mack Montelongo MD based on my observations and the provider's statements to me.        Mack Montelongo MD  03/27/18 6153

## 2018-03-27 NOTE — PROGRESS NOTES
SPIRITUAL HEALTH SERVICES Progress Note  [facility]     , referral visit. The patient's nephew shared about the patient's brother who lives in the same care facility as the patient who is struggling with health and may well be short in days towards the end of life. The nephew shared this connection as a view that the patient may have declining health as well in keeping with her brother. The patient declined  for prayer. The nephew agree for the  to come back the next day.      provided care in presence and listening.     Struggling to cope with family member also in poor health, has support from nephew and other family members.       will follow and return on Wednesday.      Fantasma Granado  Chaplain Resident

## 2018-03-27 NOTE — PROGRESS NOTES
RECEIVING UNIT ED HANDOFF REVIEW    ED Nurse Handoff Report was reviewed by: Enedelia Gutierrez on March 27, 2018 at 12:19 PM

## 2018-03-27 NOTE — PROGRESS NOTES
Rec'd a call from RN staff that family requesting removal of heparin SQ from med list re: hx of bleeding, concern for falls.  Will d/c heparin and start bilat PCDs.     Jazmyn Boykin CNP  hospitalist NP  [phone]

## 2018-03-27 NOTE — PHARMACY-ADMISSION MEDICATION HISTORY
Admission medication history interview status for the 3/27/2018  admission is complete. See EPIC admission navigator for prior to admission medications     Medication history source reliability:Good    Actions taken by pharmacist (provider contacted, etc): called Nellie for last doses      Additional medication history information not noted on PTA med list :None    Medication reconciliation/reorder completed by provider prior to medication history? No    Time spent in this activity: 15 min    Prior to Admission medications    Medication Sig Last Dose Taking? Auth Provider   FUROSEMIDE PO Take 10 mg by mouth every morning  Yes Unknown, Entered By History   ACETAMINOPHEN PO Take 650 mg by mouth 2 times daily  Yes Unknown, Entered By History   DIPHENHYDRAMINE HCL PO Take 25 mg by mouth every 6 hours as needed Past Month at Unknown time Yes Unknown, Entered By History   senna-docusate (SENOKOT-S;PERICOLACE) 8.6-50 MG per tablet Take 1 tablet by mouth 2 times daily as needed for constipation Past Month at Unknown time Yes Unknown, Entered By History   pantoprazole (PROTONIX) 40 MG enteric coated tablet Take 1 tablet (40 mg) by mouth daily Take 30-60 minutes before a meal.  Patient taking differently: Take 40 mg by mouth daily Take 30-60 minutes before a meal.    Takes at 1600 3/26/2018 at 1600 Yes Shelia Ken MD   multivitamin, therapeutic (THERA-VIT) TABS Take 1 tablet by mouth daily 3/27/2018 at Unknown time Yes Unknown, Entered By History

## 2018-03-27 NOTE — IP AVS SNAPSHOT
MRN:[id number]                      After Visit Summary   3/27/2018    Kylah Conroy    MRN: [id number]           Thank you!     Thank you for choosing [facility] for your care. Our goal is always to provide you with excellent care. Hearing back from our patients is one way we can continue to improve our services. Please take a few minutes to complete the written survey that you may receive in the mail after you visit with us. Thank you!        Patient Information     Date Of Birth          7/10/1924        Designated Caregiver       Most Recent Value    Caregiver    Will someone help with your care after discharge? yes    Name of designated caregiver care facility staff    Phone number of caregiver care facility staff    Caregiver address [facility]      About your hospital stay     You were admitted on:  March 27, 2018 You last received care in the:  [place] [facility] [address] [facility]    You were discharged on:  March 30, 2018        Reason for your hospital stay       This is a 89 year old female admitted with a urinary tract infection.                  Who to Call     For medical emergencies, please call 911.  For non-urgent questions about your medical care, please call your primary care provider or clinic, [phone]          Attending Provider     Provider Specialty    Mack Montelongo MD Emergency Medicine    [facility]       Primary Care Provider Office Phone # Fax #    Toya Ley [phone]      After Care Instructions     Activity       Your activity upon discharge: activity as tolerated            Diet       Follow this diet upon discharge: Orders Placed This Encounter      Combination Diet Regular Diet Adult                  Follow-up Appointments     Follow-up and recommended labs and tests        Follow up with primary care provider, Toya Ley MD, within 7 days for hospital follow- up.  The  "following labs/tests are recommended: BMP.                  Pending Results     Date and Time Order Name Status Description    3/27/2018 0932 Blood culture Preliminary     3/27/2018 0932 Blood culture Preliminary             Statement of Approval     Ordered          18 1420  I have reviewed and agree with all the recommendations and orders detailed in this document.  EFFECTIVE NOW     Approved and electronically signed by:  Castillo Hummel MD             Admission Information     Date & Time Provider Department Dept. Phone    3/27/2018 Castillo Hummel MD [place] [address] [facility] [phone]      Your Vitals Were     Blood Pressure Pulse Temperature Respirations Height Weight    140/74 (BP Location: Right arm) 86 98.5  F (36.9  C) (Oral) 16 1.524 m (5') 42.4 kg (93 lb 7.6 oz)    Pulse Oximetry BMI (Body Mass Index)                91% 18.26 kg/m2          [employer] Information     [employer] lets you send messages to your doctor, view your test results, renew your prescriptions, schedule appointments and more. To sign up, go to [email or web address] . Click on \"Log in\" on the left side of the screen, which will take you to the Welcome page. Then click on \"Sign up Now\" on the right side of the page.     You will be asked to enter the access code listed below, as well as some personal information. Please follow the directions to create your username and password.     Your access code is: [email or web address]  Expires: 2018  1:36 PM     Your access code will  in 90 days. If you need help or a new code, please call your [facility] clinic or [phone].        Care EveryWhere ID     This is your Care EveryWhere ID. This could be used by other organizations to access your [facility] medical records  [id number]        Equal Access to Services     KELLEN MORALES : Jonas Kelly, wafionada austinadaha, qaybta kaalmada dickson, shane reyes. So tess " [phone].    ATENCIÓN: Si kristy melendez, tiene a ramos disposición servicios gratuitos de asistencia lingüística. Lety carmona [phone].    We comply with applicable federal civil rights laws and [place] laws. We do not discriminate on the basis of race, color, national origin, age, disability, sex, sexual orientation, or gender identity.               Review of your medicines      START taking        Dose / Directions    azithromycin 250 MG tablet   Commonly known as:  ZITHROMAX   Used for:  Acute bronchitis, unspecified organism        Dose:  250 mg   Start taking on:  3/31/2018   Take 1 tablet (250 mg) by mouth daily for 1 day   Quantity:  1 tablet   Refills:  0       cefixime 200 MG/5ML Susr   Used for:  Urinary tract infection with hematuria, site unspecified        Dose:  200 mg   Start taking on:  3/31/2018   Take 200 mg by mouth daily for 3 days   Quantity:  15 mL   Refills:  0         CONTINUE these medicines which may have CHANGED, or have new prescriptions. If we are uncertain of the size of tablets/capsules you have at home, strength may be listed as something that might have changed.        Dose / Directions    pantoprazole 40 MG EC tablet   Commonly known as:  PROTONIX   This may have changed:  additional instructions   Used for:  Hematochezia        Dose:  40 mg   Take 1 tablet (40 mg) by mouth daily Take 30-60 minutes before a meal.   Quantity:  30 tablet   Refills:  1         CONTINUE these medicines which have NOT CHANGED        Dose / Directions    ACETAMINOPHEN PO        Dose:  650 mg   Take 650 mg by mouth 2 times daily   Refills:  0       DIPHENHYDRAMINE HCL PO   Indication:  Itching        Dose:  25 mg   Take 25 mg by mouth every 6 hours as needed   Refills:  0       FUROSEMIDE PO        Dose:  10 mg   Take 10 mg by mouth every morning   Refills:  0       multivitamin, therapeutic Tabs tablet        Dose:  1 tablet   Take 1 tablet by mouth daily   Refills:  0       senna-docusate 8.6-50 MG  per tablet   Commonly known as:  SENOKOT-S;PERICOLACE        Dose:  1 tablet   Take 1 tablet by mouth 2 times daily as needed for constipation   Refills:  0            Where to get your medicines      These medications were sent to [facility] - [address]  [address] [place] [address]     Phone:  [phone]     azithromycin 250 MG tablet    cefixime 200 MG/5ML Susr                Protect others around you: Learn how to safely use, store and throw away your medicines at www.disposemymeds.org.        ANTIBIOTIC INSTRUCTION     You've Been Prescribed an Antibiotic - Now What?  Your healthcare team thinks that you or your loved one might have an infection. Some infections can be treated with antibiotics, which are powerful, life-saving drugs. Like all medications, antibiotics have side effects and should only be used when necessary. There are some important things you should know about your antibiotic treatment.      Your healthcare team may run tests before you start taking an antibiotic.    Your team may take samples (e.g., from your blood, urine or other areas) to run tests to look for bacteria. These test can be important to determine if you need an antibiotic at all and, if you do, which antibiotic will work best.      Within a few days, your healthcare team might change or even stop your antibiotic.    Your team may start you on an antibiotic while they are working to find out what is making you sick.    Your team might change your antibiotic because test results show that a different antibiotic would be better to treat your infection.    In some cases, once your team has more information, they learn that you do not need an antibiotic at all. They may find out that you don't have an infection, or that the antibiotic you're taking won't work against your infection. For example, an infection caused by a virus can't be treated with antibiotics. Staying on an  antibiotic when you don't need it is more likely to be harmful than helpful.      You may experience side effects from your antibiotic.    Like all medications, antibiotics have side effects. Some of these can be serious.    Let you healthcare team know if you have any known allergies when you are admitted to the hospital.    One significant side effect of nearly all antibiotics is the risk of severe and sometimes deadly diarrhea caused by Clostridium difficile (C. Difficile). This occurs when a person takes antibiotics because some good germs are destroyed. Antibiotic use allows C. diificile to take over, putting patients at high risk for this serious infection.    As a patient or caregiver, it is important to understand your or your loved one's antibiotic treatment. It is especially important for caregivers to speak up when patients can't speak for themselves. Here are some important questions to ask your healthcare team.    What infection is this antibiotic treating and how do you know I have that infection?    What side effects might occur from this antibiotic?    How long will I need to take this antibiotic?    Is it safe to take this antibiotic with other medications or supplements (e.g., vitamins) that I am taking?     Are there any special directions I need to know about taking this antibiotic? For example, should I take it with food?    How will I be monitored to know whether my infection is responding to the antibiotic?    What tests may help to make sure the right antibiotic is prescribed for me?      Information provided by:  www.cdc.gov/getsmart  U.S. Department of Health and Human Services  Centers for disease Control and Prevention  National Center for Emerging and Zoonotic Infectious Diseases  Division of Healthcare Quality Promotion             Medication List: This is a list of all your medications and when to take them. Check marks below indicate your daily home schedule. Keep this list as a  reference.      Medications           Morning Afternoon Evening Bedtime As Needed    ACETAMINOPHEN PO   Take 650 mg by mouth 2 times daily   Last time this was given:  650 mg on 3/30/2018  2:52 PM                                azithromycin 250 MG tablet   Commonly known as:  ZITHROMAX   Take 1 tablet (250 mg) by mouth daily for 1 day   Start taking on:  3/31/2018                                cefixime 200 MG/5ML Susr   Take 200 mg by mouth daily for 3 days   Start taking on:  3/31/2018                                DIPHENHYDRAMINE HCL PO   Take 25 mg by mouth every 6 hours as needed                                FUROSEMIDE PO   Take 10 mg by mouth every morning                                multivitamin, therapeutic Tabs tablet   Take 1 tablet by mouth daily                                pantoprazole 40 MG EC tablet   Commonly known as:  PROTONIX   Take 1 tablet (40 mg) by mouth daily Take 30-60 minutes before a meal.   Last time this was given:  40 mg on 3/30/2018  8:22 AM                                senna-docusate 8.6-50 MG per tablet   Commonly known as:  SENOKOT-S;PERICOLACE   Take 1 tablet by mouth 2 times daily as needed for constipation

## 2018-03-27 NOTE — IP AVS SNAPSHOT
[place] [address] [facility]: [phone]                                              INTERAGENCY TRANSFER FORM - PHYSICIAN ORDERS   3/27/2018                    Hospital Admission Date: 3/27/2018  ELY JONES   : 7/10/1924  Sex: Female        Attending Provider: Castillo Hummel MD     Allergies:  No Known Allergies    Infection:  None   Service:  HOSPITALIST    Ht:  1.524 m (5')   Wt:  42.4 kg (93 lb 7.6 oz)   Admission Wt:  40.8 kg (90 lb)    BMI:  18.26 kg/m 2   BSA:  1.34 m 2            Patient PCP Information     Provider PCP Type    Toya Ley MD, MD General      ED Clinical Impression     Diagnosis Description Comment Added By Time Added    Urinary tract infection with hematuria, site unspecified [N39.0, R31.9] Urinary tract infection with hematuria, site unspecified [N39.0, R31.9]  Mack Montelongo MD 3/27/2018 10:21 AM    Severe sepsis (H) [A41.9, R65.20] Severe sepsis (H) [A41.9, R65.20]  Mack Montelongo MD 3/27/2018 10:22 AM    Chronic atrial fibrillation (H) [I48.2] Chronic atrial fibrillation (H) [I48.2]  Mack Montelongo MD 3/27/2018 10:22 AM      Hospital Problems as of 3/30/2018              Priority Class Noted POA    Cystitis Medium  3/27/2018 Yes      Non-Hospital Problems as of 3/30/2018              Priority Class Noted    GI bleeding Medium  2016    CHF (congestive heart failure) (H) Medium  2016    Sacral fracture (H) Medium  2016    ACP (advance care planning) Medium  2016      Code Status History     Date Active Date Inactive Code Status Order ID Comments User Context    3/30/2018  1:34 PM  DNR/DNI [id number]  Castillo Hummel MD Outpatient    3/27/2018 12:55 PM 3/30/2018  1:34 PM DNR/DNI [id number]  Jazmyn Boykin APRN CNP Inpatient    2016 12:40 PM 2016  9:05 PM DNR/DNI [id number]  Angela Herring PA-C ED    3/2/2016  8:48 AM 2016 12:40 PM DNR/MO [id number]  Ignacio Thomas MD Outpatient     2/28/2016  8:45 PM 3/2/2016  8:48 AM DNR/DNI [id number]  Yuliya Padilla MD Inpatient    2/25/2016  1:42 AM 2/26/2016  4:57 PM DNR/DNI [id number]  Janey Stearns MD Inpatient         Medication Review      START taking        Dose / Directions Comments    azithromycin 250 MG tablet   Commonly known as:  ZITHROMAX   Used for:  Acute bronchitis, unspecified organism        Dose:  250 mg   Start taking on:  3/31/2018   Take 1 tablet (250 mg) by mouth daily for 1 day   Quantity:  1 tablet   Refills:  0        cefixime 200 MG/5ML Susr   Used for:  Urinary tract infection with hematuria, site unspecified        Dose:  200 mg   Start taking on:  3/31/2018   Take 200 mg by mouth daily for 3 days   Quantity:  15 mL   Refills:  0          CONTINUE these medications which may have CHANGED, or have new prescriptions. If we are uncertain of the size of tablets/capsules you have at home, strength may be listed as something that might have changed.        Dose / Directions Comments    pantoprazole 40 MG EC tablet   Commonly known as:  PROTONIX   This may have changed:  additional instructions   Used for:  Hematochezia        Dose:  40 mg   Take 1 tablet (40 mg) by mouth daily Take 30-60 minutes before a meal.   Quantity:  30 tablet   Refills:  1          CONTINUE these medications which have NOT CHANGED        Dose / Directions Comments    ACETAMINOPHEN PO        Dose:  650 mg   Take 650 mg by mouth 2 times daily   Refills:  0        DIPHENHYDRAMINE HCL PO   Indication:  Itching        Dose:  25 mg   Take 25 mg by mouth every 6 hours as needed   Refills:  0        FUROSEMIDE PO        Dose:  10 mg   Take 10 mg by mouth every morning   Refills:  0        multivitamin, therapeutic Tabs tablet        Dose:  1 tablet   Take 1 tablet by mouth daily   Refills:  0        senna-docusate 8.6-50 MG per tablet   Commonly known as:  SENOKOT-S;PERICOLACE        Dose:  1 tablet   Take 1 tablet by mouth 2 times daily as needed for  constipation   Refills:  0                Summary of Visit     Reason for your hospital stay       This is a 89 year old female admitted with a urinary tract infection.             After Care     Activity       Your activity upon discharge: activity as tolerated       Diet       Follow this diet upon discharge: Orders Placed This Encounter      Combination Diet Regular Diet Adult              MD face to face encounter       Patient to resume previous home health care services.                  Follow-Up Appointment Instructions     Future Labs/Procedures    Follow-up and recommended labs and tests      Comments:    Follow up with primary care provider, Toya Ley MD, within 7 days for hospital follow- up.  The following labs/tests are recommended: BMP.      Follow-Up Appointment Instructions     Follow-up and recommended labs and tests        Follow up with primary care provider, Toya Ley MD, within 7 days for hospital follow- up.  The following labs/tests are recommended: BMP.             Statement of Approval     Ordered          03/30/18 1420  I have reviewed and agree with all the recommendations and orders detailed in this document.  EFFECTIVE NOW     Approved and electronically signed by:  Castillo Hummel MD

## 2018-03-27 NOTE — IP AVS SNAPSHOT
[place] [address] [facility]: [phone]                                              INTERAGENCY TRANSFER FORM - LAB / IMAGING / EKG / EMG RESULTS   3/27/2018                    Hospital Admission Date: 3/27/2018  ELY JONES   : 7/10/1924  Sex: Female        Attending Provider: Castillo Hummel MD     Allergies:  No Known Allergies    Infection:  None   Service:  HOSPITALIST    Ht:  1.524 m (5')   Wt:  42.4 kg (93 lb 7.6 oz)   Admission Wt:  40.8 kg (90 lb)    BMI:  18.26 kg/m 2   BSA:  1.34 m 2            Patient PCP Information     Provider PCP Type    Toya Ley MD, MD General         Lab Results - 3 Days      Blood culture [[id number]]  Resulted: 18 1251, Result status: Preliminary result    Ordering provider: Mack Montelongo MD  18 0932 Resulting lab: [facility]    Specimen Information    Type Source Collected On   Blood Arm, Left 18 1010   Comment:  Left Arm          Components       Value Reference Range Flag Lab   Specimen Description Blood Left Arm      Special Requests Aerobic and anaerobic bottles received   FrStHsLb   Culture Micro No growth after 3 days   225            Blood culture [[id number]]  Resulted: 18 1251, Result status: Preliminary result    Ordering provider: Mack Montelongo MD  18 0932 Resulting lab: [facility]    Specimen Information    Type Source Collected On   Peripheral blood Arm, Forearm Only, Left 18 0934   Comment:  Right Arm          Components       Value Reference Range Flag Lab   Specimen Description Peripheral blood Right Arm      Special Requests Aerobic and anaerobic bottles received   FrStHsLb   Culture Micro No growth after 3 days   225            Magnesium [[id number]]  Resulted: 18 0934, Result status: Final result    Ordering provider: Castillo Hummel MD  18 0000 Resulting lab: [facility]     Specimen Information    Type Source Collected On   Blood  03/30/18 0902          Components       Value Reference Range Flag Lab   Magnesium 1.8 1.6 - 2.3 mg/dL  FrStHsLb            Phosphorus [[id number]]  Resulted: 03/30/18 0934, Result status: Final result    Ordering provider: Castillo Hummel MD  03/30/18 0000 Resulting lab: [facility]    Specimen Information    Type Source Collected On   Blood  03/30/18 0902          Components       Value Reference Range Flag Lab   Phosphorus 3.0 2.5 - 4.5 mg/dL  FrStHsLb            Potassium [[id number]]  Resulted: 03/30/18 0934, Result status: Final result    Ordering provider: Castillo Hummel MD  03/30/18 0000 Resulting lab: [facility]    Specimen Information    Type Source Collected On   Blood  03/30/18 0902          Components       Value Reference Range Flag Lab   Potassium 3.8 3.4 - 5.3 mmol/L  FrStHsLb            Platelet count [[id number]]  Resulted: 03/30/18 0918, Result status: Final result    Ordering provider: Jazmyn Boykin APRN CNP  03/30/18 0000 Resulting lab: [facility]    Specimen Information    Type Source Collected On   Blood  03/30/18 0902          Components       Value Reference Range Flag Lab   Platelet Count 194 150 - 450 10e9/L  FrStHsLb            Urine Culture [[id number]] (Abnormal)  Resulted: 03/29/18 2206, Result status: Final result    Ordering provider: Mack Montelongo MD  03/27/18 0932 Resulting lab: [facility]    Specimen Information    Type Source Collected On   Catheterized Urine Urine catheter 03/27/18 0945          Components       Value Reference Range Flag Lab   Specimen Description Catheterized Urine      Special Requests Specimen received in preservative   75   Culture Micro --  A 225   Result:         >100,000 colonies/mL  Klebsiella pneumoniae     Culture Micro --  A 225   Result:         >100,000 colonies/mL  Strain 2  Klebsiella  pneumoniae              Magnesium [[id number]]  Resulted: 03/29/18 0821, Result status: Final result    Ordering provider: Castillo Hummel MD  03/29/18 0001 Resulting lab: [facility]    Specimen Information    Type Source Collected On   Blood  03/29/18 0752          Components       Value Reference Range Flag Lab   Magnesium 1.7 1.6 - 2.3 mg/dL  FrStHsLb            Phosphorus [[id number]] (Abnormal)  Resulted: 03/29/18 0821, Result status: Final result    Ordering provider: Castillo Hummel MD  03/29/18 0001 Resulting lab: [facility]    Specimen Information    Type Source Collected On   Blood  03/29/18 0752          Components       Value Reference Range Flag Lab   Phosphorus 2.4 2.5 - 4.5 mg/dL L FrStHsLb            Basic metabolic panel [[id number]] (Abnormal)  Resulted: 03/29/18 0821, Result status: Final result    Ordering provider: Castillo Hummel MD  03/29/18 0001 Resulting lab: [facility]    Specimen Information    Type Source Collected On   Blood  03/29/18 0752          Components       Value Reference Range Flag Lab   Sodium 140 133 - 144 mmol/L  FrStHsLb   Potassium 3.8 3.4 - 5.3 mmol/L  FrStHsLb   Chloride 109 94 - 109 mmol/L  FrStHsLb   Carbon Dioxide 25 20 - 32 mmol/L  FrStHsLb   Anion Gap 6 3 - 14 mmol/L  FrStHsLb   Glucose 91 70 - 99 mg/dL  FrStHsLb   Urea Nitrogen 10 7 - 30 mg/dL  FrStHsLb   Creatinine 0.66 0.52 - 1.04 mg/dL  FrStHsLb   GFR Estimate 84 >60 mL/min/1.7m2  FrStHsLb   Comment:  Non  GFR Calc   GFR Estimate If Black >90 >60 mL/min/1.7m2  FrStHsLb   Comment:  African American GFR Calc   Calcium 7.8 8.5 - 10.1 mg/dL L FrStHsLb            CBC with platelets [[id number]] (Abnormal)  Resulted: 03/29/18 0804, Result status: Final result    Ordering provider: Castillo Hummel MD  03/29/18 0001 Resulting lab: [facility]    Specimen Information    Type Source Collected On   Blood  03/29/18 0752           Components       Value Reference Range Flag Lab   WBC 6.0 4.0 - 11.0 10e9/L  FrStHsLb   RBC Count 3.38 3.8 - 5.2 10e12/L L FrStHsLb   Hemoglobin 10.3 11.7 - 15.7 g/dL L FrStHsLb   Hematocrit 31.4 35.0 - 47.0 % L FrStHsLb   MCV 93 78 - 100 fl  FrStHsLb   MCH 30.5 26.5 - 33.0 pg  FrStHsLb   MCHC 32.8 31.5 - 36.5 g/dL  FrStHsLb   RDW 13.7 10.0 - 15.0 %  FrStHsLb   Platelet Count 150 150 - 450 10e9/L  FrStHsLb            Magnesium [[id number]]  Resulted: 03/28/18 0923, Result status: Final result    Ordering provider: Jazmyn Boykin APRN CNP  03/28/18 0001 Resulting lab: [facility]    Specimen Information    Type Source Collected On   Blood  03/28/18 0851          Components       Value Reference Range Flag Lab   Magnesium 2.1 1.6 - 2.3 mg/dL  FrStHsLb            Phosphorus [[id number]] (Abnormal)  Resulted: 03/28/18 0923, Result status: Final result    Ordering provider: Jazmyn Boykin APRN CNP  03/28/18 0001 Resulting lab: [facility]    Specimen Information    Type Source Collected On   Blood  03/28/18 0851          Components       Value Reference Range Flag Lab   Phosphorus 2.4 2.5 - 4.5 mg/dL L FrStHsLb            Basic metabolic panel [[id number]] (Abnormal)  Resulted: 03/28/18 0923, Result status: Final result    Ordering provider: Jazmyn Boykin APRN CNP  03/28/18 0001 Resulting lab: [facility]    Specimen Information    Type Source Collected On   Blood  03/28/18 0851          Components       Value Reference Range Flag Lab   Sodium 138 133 - 144 mmol/L  FrStHsLb   Potassium 3.8 3.4 - 5.3 mmol/L  FrStHsLb   Chloride 105 94 - 109 mmol/L  FrStHsLb   Carbon Dioxide 26 20 - 32 mmol/L  FrStHsLb   Anion Gap 7 3 - 14 mmol/L  FrStHsLb   Glucose 95 70 - 99 mg/dL  FrStHsLb   Urea Nitrogen 15 7 - 30 mg/dL  FrStHsLb   Creatinine 0.78 0.52 - 1.04 mg/dL  FrStHsLb   GFR Estimate 69 >60 mL/min/1.7m2  FrStHsLb   Comment:  Non  GFR Calc   GFR Estimate If  Black 84 >60 mL/min/1.7m2  FrStHsLb   Comment:  African American GFR Calc   Calcium 7.9 8.5 - 10.1 mg/dL L FrStHsLb            CBC with platelets [[id number]] (Abnormal)  Resulted: 03/28/18 0908, Result status: Final result    Ordering provider: Jazmyn Boykin APRN CNP  03/28/18 0001 Resulting lab: [facility]    Specimen Information    Type Source Collected On   Blood  03/28/18 0851          Components       Value Reference Range Flag Lab   WBC 12.0 4.0 - 11.0 10e9/L H FrStHsLb   RBC Count 3.60 3.8 - 5.2 10e12/L L FrStHsLb   Hemoglobin 11.1 11.7 - 15.7 g/dL L FrStHsLb   Hematocrit 33.5 35.0 - 47.0 % L FrStHsLb   MCV 93 78 - 100 fl  FrStHsLb   MCH 30.8 26.5 - 33.0 pg  FrStHsLb   MCHC 33.1 31.5 - 36.5 g/dL  FrStHsLb   RDW 13.9 10.0 - 15.0 %  FrStHsLb   Platelet Count 163 150 - 450 10e9/L  FrStHsLb            Influenza A and B and RSV PCR [[id number]]  Resulted: 03/27/18 2222, Result status: Final result    Ordering provider: Jazmyn Boykin APRN CNP  03/27/18 1255 Resulting lab: [facility]    Specimen Information    Type Source Collected On   Nares Nasopharyngeal 03/27/18 1831          Components       Value Reference Range Flag Lab   Specimen Description Nares   FrStHsLb   Influenza A PCR Negative NEG^Negative  51   Comment:         Flu A target RNA not detected, presumed negative for Influenza A or the viral   load is below the limit of detection.     Influenza B PCR Negative NEG^Negative  51   Comment:         Flu B target RNA not detected, presumed negative for Influenza B or the viral   load is below the limit of detection.     Resp Syncytial Virus Negative NEG^Negative  51   Comment:         RSV target RNA not detected, presumed negative for Respiratory Syncitial Virus   or the viral load is below the limit of detection.  FDA approved assay performed using Visiprise GeneXpert(R) real-time PCR.              Magnesium [[id number]] (Abnormal)  Resulted: 03/27/18 2001,  Result status: Final result    Ordering provider: Jazmyn Boykin APRN CNP  03/27/18 1640 Resulting lab: [facility]    Specimen Information    Type Source Collected On   Blood  03/27/18 1925          Components       Value Reference Range Flag Lab   Magnesium 2.9 1.6 - 2.3 mg/dL H FrStHsLb            Platelet count [[id number]]  Resulted: 03/27/18 1440, Result status: Final result    Ordering provider: Jazmyn Boykin APRN CNP  03/27/18 1255 Resulting lab: [facility]    Specimen Information    Type Source Collected On   Blood  03/27/18 1420          Components       Value Reference Range Flag Lab   Platelet Count 165 150 - 450 10e9/L  FrStHsLb            Lactic acid whole blood [[id number]]  Resulted: 03/27/18 1436, Result status: Final result    Ordering provider: Jazmyn Boykin APRN CNP  03/27/18 1255 Resulting lab: [facility]    Specimen Information    Type Source Collected On   Blood  03/27/18 1420          Components       Value Reference Range Flag Lab   Lactic Acid 1.8 0.7 - 2.0 mmol/L  FrStHsLb            Glucose by meter [[id number]] (Abnormal)  Resulted: 03/27/18 1221, Result status: Final result    Ordering provider: Mack Montelongo MD  03/27/18 1209 Resulting lab: POINT OF CARE TEST, GLUCOSE    Specimen Information    Type Source Collected On     03/27/18 1209          Components       Value Reference Range Flag Lab   Glucose 117 70 - 99 mg/dL H 170            Phosphorus [[id number]]  Resulted: 03/27/18 1137, Result status: Final result    Ordering provider: Mack Montelongo MD  03/27/18 0934 Resulting lab: [facility]    Specimen Information    Type Source Collected On     03/27/18 0934          Components       Value Reference Range Flag Lab   Phosphorus 2.6 2.5 - 4.5 mg/dL  FrStHsLb            Magnesium [[id number]] (Abnormal)  Resulted: 03/27/18 1137, Result status: Final result    Ordering provider: Mack Montelongo MD   03/27/18 0934 Resulting lab: [facility]    Specimen Information    Type Source Collected On     03/27/18 0934          Components       Value Reference Range Flag Lab   Magnesium 1.4 1.6 - 2.3 mg/dL L FrStHsLb            Lactic acid whole blood [[id number]] (Abnormal)  Resulted: 03/27/18 1128, Result status: Final result    Ordering provider: Mack Montelongo MD  03/27/18 1032 Resulting lab: [facility]    Specimen Information    Type Source Collected On   Blood  03/27/18 1116          Components       Value Reference Range Flag Lab   Lactic Acid 2.6 0.7 - 2.0 mmol/L H FrStHsLb            Comprehensive metabolic panel [[id number]] (Abnormal)  Resulted: 03/27/18 1013, Result status: Final result    Ordering provider: Mack Montelongo MD  03/27/18 0932 Resulting lab: [facility]    Specimen Information    Type Source Collected On   Blood  03/27/18 0934          Components       Value Reference Range Flag Lab   Sodium 134 133 - 144 mmol/L  FrStHsLb   Potassium 4.0 3.4 - 5.3 mmol/L  FrStHsLb   Chloride 99 94 - 109 mmol/L  FrStHsLb   Carbon Dioxide 26 20 - 32 mmol/L  FrStHsLb   Anion Gap 9 3 - 14 mmol/L  FrStHsLb   Glucose 200 70 - 99 mg/dL H FrStHsLb   Urea Nitrogen 16 7 - 30 mg/dL  FrStHsLb   Creatinine 0.83 0.52 - 1.04 mg/dL  FrStHsLb   GFR Estimate 64 >60 mL/min/1.7m2  FrStHsLb   Comment:  Non  GFR Calc   GFR Estimate If Black 77 >60 mL/min/1.7m2  FrStHsLb   Comment:  African American GFR Calc   Calcium 8.7 8.5 - 10.1 mg/dL  FrStHsLb   Bilirubin Total 0.6 0.2 - 1.3 mg/dL  FrStHsLb   Albumin 3.2 3.4 - 5.0 g/dL L FrStHsLb   Protein Total 7.5 6.8 - 8.8 g/dL  FrStHsLb   Alkaline Phosphatase 135 40 - 150 U/L  FrStHsLb   ALT 15 0 - 50 U/L  FrStHsLb   AST 31 0 - 45 U/L  FrStHsLb            Troponin I [[id number]]  Resulted: 03/27/18 1013, Result status: Final result    Ordering provider: Mack Montelongo MD  03/27/18 0932 Resulting lab: [place]  [facility]    Specimen Information    Type Source Collected On   Blood  03/27/18 0934          Components       Value Reference Range Flag Lab   Troponin I ES 0.016 0.000 - 0.045 ug/L  FrStHsLb   Comment:         The 99th percentile for upper reference range is 0.045 ug/L.  Troponin values   in the range of 0.045 - 0.120 ug/L may be associated with risks of adverse   clinical events.              Nt probnp inpatient (BNP) [[id number]]  Resulted: 03/27/18 1013, Result status: Final result    Ordering provider: Mack Montelongo MD  03/27/18 0932 Resulting lab: [facility]    Specimen Information    Type Source Collected On   Blood  03/27/18 0934          Components       Value Reference Range Flag Lab   N-Terminal Pro BNP Inpatient 702 0 - 1800 pg/mL  FrStHsLb   Comment:            Reference range shown and results flagged as abnormal are suggested inpatient   cut points for confirming diagnosis if CHF in an acute setting. Establishing a   baseline value for each individual patient is useful for follow-up. An   inpatient or emergency department NT-proPBNP <300 pg/mL effectively rules out   acute CHF, with 99% negative predictive value.  The outpatient non-acute reference range for ruling out CHF is:   0-125 pg/mL (age 18 to less than 75)   0-450 pg/mL (age 75 yrs and older)              Influenza A/B antigen [[id number]]  Resulted: 03/27/18 1009, Result status: Final result    Ordering provider: Mack Montelongo MD  03/27/18 0932 Resulting lab: [facility]    Specimen Information    Type Source Collected On   Nose Nose 03/27/18 0934          Components       Value Reference Range Flag Lab   Influenza A/B Agn Specimen Nose   FrStHsLb   Influenza A Negative NEG^Negative  FrStHsLb   Influenza B Negative NEG^Negative  FrStHsLb   Comment:         Test results must be correlated with clinical data. If necessary, results   should be confirmed by a molecular assay or viral  culture.              UA with Microscopic [[id number]] (Abnormal)  Resulted: 03/27/18 1004, Result status: Final result    Ordering provider: Mack Montelongo MD  03/27/18 0932 Resulting lab: [facility]    Specimen Information    Type Source Collected On   Catheterized Urine Urine catheter 03/27/18 0945          Components       Value Reference Range Flag Lab   Color Urine Yellow   FrStHsLb   Appearance Urine Cloudy   FrStHsLb   Glucose Urine Negative NEG^Negative mg/dL  FrStHsLb   Bilirubin Urine Negative NEG^Negative  FrStHsLb   Ketones Urine Negative NEG^Negative mg/dL  FrStHsLb   Specific [place] Urine 1.013 1.003 - 1.035  FrStHsLb   Blood Urine Moderate NEG^Negative A FrStHsLb   pH Urine 7.0 5.0 - 7.0 pH  FrStHsLb   Protein Albumin Urine 30 NEG^Negative mg/dL A FrStHsLb   Urobilinogen mg/dL Normal 0.0 - 2.0 mg/dL  FrStHsLb   Nitrite Urine Negative NEG^Negative  FrStHsLb   Leukocyte Esterase Urine Large NEG^Negative A FrStHsLb   Source Catheterized Urine   FrStHsLb   WBC Urine >182 0 - 5 /HPF H FrStHsLb   RBC Urine 36 0 - 2 /HPF H FrStHsLb   WBC Clumps Present NEG^Negative /HPF A FrStHsLb   Bacteria Urine Many NEG^Negative /HPF A FrStHsLb            INR [[id number]]  Resulted: 03/27/18 0959, Result status: Final result    Ordering provider: Mack Montelongo MD  03/27/18 0932 Resulting lab: [facility]    Specimen Information    Type Source Collected On   Blood  03/27/18 0934          Components       Value Reference Range Flag Lab   INR 1.09 0.86 - 1.14  FrStHsLb            Lactic acid whole blood [[id number]] (Abnormal)  Resulted: 03/27/18 0952, Result status: Final result    Ordering provider: Mack Montelongo MD  03/27/18 0932 Resulting lab: [facility]    Specimen Information    Type Source Collected On   Blood  03/27/18 0934          Components       Value Reference Range Flag Lab   Lactic Acid 2.8 0.7 - 2.0 mmol/L H FrStHsLb            CBC with  platelets differential [[id number]] (Abnormal)  Resulted: 03/27/18 0951, Result status: Final result    Ordering provider: Mack Montelongo MD  03/27/18 0932 Resulting lab: [facility]    Specimen Information    Type Source Collected On   Blood  03/27/18 0934          Components       Value Reference Range Flag Lab   WBC 10.8 4.0 - 11.0 10e9/L  FrStHsLb   RBC Count 3.82 3.8 - 5.2 10e12/L  FrStHsLb   Hemoglobin 11.8 11.7 - 15.7 g/dL  FrStHsLb   Hematocrit 35.6 35.0 - 47.0 %  FrStHsLb   MCV 93 78 - 100 fl  FrStHsLb   MCH 30.9 26.5 - 33.0 pg  FrStHsLb   MCHC 33.1 31.5 - 36.5 g/dL  FrStHsLb   RDW 13.6 10.0 - 15.0 %  FrStHsLb   Platelet Count 181 150 - 450 10e9/L  FrStHsLb   Diff Method Automated Method   FrStHsLb   % Neutrophils 85.2 %  FrStHsLb   % Lymphocytes 9.9 %  FrStHsLb   % Monocytes 4.0 %  FrStHsLb   % Eosinophils 0.0 %  FrStHsLb   % Basophils 0.3 %  FrStHsLb   % Immature Granulocytes 0.6 %  FrStHsLb   Nucleated RBCs 0 0 /100  FrStHsLb   Absolute Neutrophil 9.2 1.6 - 8.3 10e9/L H FrStHsLb   Absolute Lymphocytes 1.1 0.8 - 5.3 10e9/L  FrStHsLb   Absolute Monocytes 0.4 0.0 - 1.3 10e9/L  FrStHsLb   Absolute Eosinophils 0.0 0.0 - 0.7 10e9/L  FrStHsLb   Absolute Basophils 0.0 0.0 - 0.2 10e9/L  FrStHsLb   Abs Immature Granulocytes 0.1 0 - 0.4 10e9/L  FrStHsLb   Absolute Nucleated RBC 0.0   FrStHsLb            Testing Performed By     Lab - Abbreviation Name Director Address Valid Date Range    14 - FrStHsLb [facility] Unknown [address] [place] [address] 05/08/15 1057 - Present    51 - Unknown [facility] [place] Unknown [address] [facility] [address] 12/31/14 1010 - Present    75 - Unknown [facility] Unknown [address] [facility] [address] 01/15/15 1019 - Present    170 - Unknown POINT OF CARE TEST, GLUCOSE Unknown Unknown 10/31/11 1114 - Present    225 - Unknown INFECT[facility] LAB[facility] Unknown  "[address] [facility] [address] 12/19/14 0954 - Present            Unresulted Labs (24h ago through future)    Start       Ordered    03/30/18 0600  Platelet count  (Pharmacological Prophylaxis- heparin (If CrCl less than 30 mL/min))  EVERY THREE DAYS,   Routine     Comments:  Repeat every 3 days while on VTE prophylaxis. If no result is listed, this lab has not been done the past 365 days. LATEST LAB RESULT: Platelet Count (10e9/L)       Date                     Value                 03/27/2018               181              ----------      03/27/18 1255    03/27/18 1128  Glucose Whole Blood POCT  EVERY SHIFT,   STAT      03/27/18 1127    Unscheduled  Potassium  (Potassium Replacement - \"Standard\" - For K levels less than 3.4 mmol/L - UU,UR,UA,RH,SH,PH,WY )  CONDITIONAL (SPECIFY),   Routine     Comments:  Obtain Potassium Level for these conditions:  *IF no potassium result within 24 hours before initiation of order set, draw potassium level with next lab collect.    *2 HOURS AFTER last IV potassium replacement dose and 4 hours after an oral replacement dose.  *Next morning after potassium dose.     Repeat Potassium Replacement if necessary.    03/29/18 1218    Unscheduled  Magnesium  (Magnesium Replacement -  Adult - \"Standard\" - Replacement for all levels less than 1.6 mg/dL )  CONDITIONAL (SPECIFY),   Routine     Comments:  Obtain Magnesium Level for these conditions:  *IF no magnesium result within 24 hrs before initiation of order set, draw magnesium level with next lab collect.    *2 HOURS AFTER last magnesium replacement dose when magnesium replacement given for level less than 1.6   *Next morning after magnesium dose.     Repeat Magnesium Replacement if necessary.    03/29/18 1218    Unscheduled  Phosphorus  (POTASSIUM Phosphate - \"Standard\" - Replacement for levels less than or equal to 2.4 mg/dL )  CONDITIONAL (SPECIFY),   Routine     Comments:  Obtain Phosphorus Level for these conditions:  *IF " no phosphorus result within 24 hrs before initiation of order set, draw phosphorus level with next lab collect.    *2 HOURS AFTER last phosphorus replacement dose for levels less than 2.0.  *Next morning after phosphorus dose.     Repeat Phosphorus Replacement if necessary.    03/29/18 1218         Imaging Results - 3 Days      XR Chest 2 Views [[id number]]  Resulted: 03/27/18 1021, Result status: Final result    Ordering provider: Mack Montelongo MD  03/27/18 0932 Resulted by: Roopa Francisco MD    Performed: 03/27/18 0950 - 03/27/18 1002 Resulting lab: RADIOLOGY RESULTS    Narrative:       XR CHEST 2 VW 3/27/2018 10:02 AM    HISTORY: Cough.    COMPARISON: 2/28/2016.    FINDINGS: Multiple old right-sided rib injuries. No new consolidation,  pleural effusion or pneumothorax. Stable cardiomegaly.      Impression:       IMPRESSION: No acute abnormality.    ROOPA FRANCISCO MD      Testing Performed By     Lab - Abbreviation Name Director Address Valid Date Range    104 - Rad Rslts RADIOLOGY RESULTS Unknown Unknown 02/16/05 1553 - Present            Encounter-Level Documents:     There are no encounter-level documents.      Order-Level Documents:     There are no order-level documents.

## 2018-03-28 ENCOUNTER — APPOINTMENT (OUTPATIENT)
Dept: PHYSICAL THERAPY | Facility: CLINIC | Age: 83
DRG: 871 | End: 2018-03-28
Attending: NURSE PRACTITIONER
Payer: MEDICARE

## 2018-03-28 LAB
ANION GAP SERPL CALCULATED.3IONS-SCNC: 7 MMOL/L (ref 3–14)
BUN SERPL-MCNC: 15 MG/DL (ref 7–30)
CALCIUM SERPL-MCNC: 7.9 MG/DL (ref 8.5–10.1)
CHLORIDE SERPL-SCNC: 105 MMOL/L (ref 94–109)
CO2 SERPL-SCNC: 26 MMOL/L (ref 20–32)
CREAT SERPL-MCNC: 0.78 MG/DL (ref 0.52–1.04)
ERYTHROCYTE [DISTWIDTH] IN BLOOD BY AUTOMATED COUNT: 13.9 % (ref 10–15)
GFR SERPL CREATININE-BSD FRML MDRD: 69 ML/MIN/1.7M2
GLUCOSE SERPL-MCNC: 95 MG/DL (ref 70–99)
HCT VFR BLD AUTO: 33.5 % (ref 35–47)
HGB BLD-MCNC: 11.1 G/DL (ref 11.7–15.7)
MAGNESIUM SERPL-MCNC: 2.1 MG/DL (ref 1.6–2.3)
MCH RBC QN AUTO: 30.8 PG (ref 26.5–33)
MCHC RBC AUTO-ENTMCNC: 33.1 G/DL (ref 31.5–36.5)
MCV RBC AUTO: 93 FL (ref 78–100)
PHOSPHATE SERPL-MCNC: 2.4 MG/DL (ref 2.5–4.5)
PLATELET # BLD AUTO: 163 10E9/L (ref 150–450)
POTASSIUM SERPL-SCNC: 3.8 MMOL/L (ref 3.4–5.3)
RBC # BLD AUTO: 3.6 10E12/L (ref 3.8–5.2)
SODIUM SERPL-SCNC: 138 MMOL/L (ref 133–144)
WBC # BLD AUTO: 12 10E9/L (ref 4–11)

## 2018-03-28 PROCEDURE — 25000128 H RX IP 250 OP 636: Performed by: NURSE PRACTITIONER

## 2018-03-28 PROCEDURE — 99232 SBSQ HOSP IP/OBS MODERATE 35: CPT | Performed by: INTERNAL MEDICINE

## 2018-03-28 PROCEDURE — 12000000 ZZH R&B MED SURG/OB

## 2018-03-28 PROCEDURE — A9270 NON-COVERED ITEM OR SERVICE: HCPCS | Mod: GY | Performed by: NURSE PRACTITIONER

## 2018-03-28 PROCEDURE — 85027 COMPLETE CBC AUTOMATED: CPT | Performed by: NURSE PRACTITIONER

## 2018-03-28 PROCEDURE — 25000132 ZZH RX MED GY IP 250 OP 250 PS 637: Mod: GY | Performed by: NURSE PRACTITIONER

## 2018-03-28 PROCEDURE — 84100 ASSAY OF PHOSPHORUS: CPT | Performed by: NURSE PRACTITIONER

## 2018-03-28 PROCEDURE — 97161 PT EVAL LOW COMPLEX 20 MIN: CPT | Mod: GP

## 2018-03-28 PROCEDURE — 80048 BASIC METABOLIC PNL TOTAL CA: CPT | Performed by: NURSE PRACTITIONER

## 2018-03-28 PROCEDURE — 36415 COLL VENOUS BLD VENIPUNCTURE: CPT | Performed by: NURSE PRACTITIONER

## 2018-03-28 PROCEDURE — 40000193 ZZH STATISTIC PT WARD VISIT

## 2018-03-28 PROCEDURE — 25000125 ZZHC RX 250: Performed by: NURSE PRACTITIONER

## 2018-03-28 PROCEDURE — 83735 ASSAY OF MAGNESIUM: CPT | Performed by: NURSE PRACTITIONER

## 2018-03-28 RX ADMIN — POTASSIUM PHOSPHATE, MONOBASIC AND POTASSIUM PHOSPHATE, DIBASIC 15 MMOL: 224; 236 INJECTION, SOLUTION INTRAVENOUS at 14:03

## 2018-03-28 RX ADMIN — CEFTRIAXONE SODIUM 2 G: 2 INJECTION, POWDER, FOR SOLUTION INTRAMUSCULAR; INTRAVENOUS at 09:53

## 2018-03-28 RX ADMIN — AZITHROMYCIN MONOHYDRATE 500 MG: 500 INJECTION, POWDER, LYOPHILIZED, FOR SOLUTION INTRAVENOUS at 12:23

## 2018-03-28 RX ADMIN — SODIUM CHLORIDE: 9 INJECTION, SOLUTION INTRAVENOUS at 14:02

## 2018-03-28 RX ADMIN — POTASSIUM CHLORIDE 20 MEQ: 1500 TABLET, EXTENDED RELEASE ORAL at 14:58

## 2018-03-28 RX ADMIN — PANTOPRAZOLE SODIUM 40 MG: 40 TABLET, DELAYED RELEASE ORAL at 08:53

## 2018-03-28 NOTE — PROGRESS NOTES
03/28/18 0800   Quick Adds   Type of Visit Initial PT Evaluation   Living Environment   Lives With facility resident   Living Arrangements assisted living   Living Environment Comment Per phone call with AL staff: Patient resides at [facility] in [place]. Walks with 1, GB and waklker. Assist with all ADLs and verbal cues for eating. Pt limited by baseline visual deficits.    Self-Care   Usual Activity Tolerance fair   Current Activity Tolerance fair   Equipment Currently Used at Home walker, rolling   Functional Level Prior   Ambulation 3-->assistive equipment and person   Transferring 3-->assistive equipment and person   Toileting 3-->assistive equipment and person   Bathing 3-->assistive equipment and person   Dressing 2-->assistive person   Eating 2-->assistive person   Cognition 1 - attention or memory deficits  (Dementia per H&P)   General Information   Onset of Illness/Injury or Date of Surgery - Date 03/27/18   Patient/Family Goals Statement Not discussed   Pertinent History of Current Problem (include personal factors and/or comorbidities that impact the POC) 89 year old female admitted with sepsis, possible HAP vs. influenza.    Precautions/Limitations fall precautions   Cognitive Status Examination   Orientation person   Level of Consciousness alert   Follows Commands and Answers Questions able to follow single-step instructions  (Needs tactile cues for hand placements)   Memory impaired  (Baseline dementia)   Pain Assessment   Patient Currently in Pain No   Strength   Strength Comments Sufficient for mobility with CGA.    Bed Mobility   Bed Mobility Comments Supine to sit with CGA, verbal/tacitle cues to sequence.    Transfer Skills   Transfer Comments Sit to/from stand with CGA   Gait   Gait Comments Ambulates 30' with FWW and CGA/min A for walker directions.    Balance   Balance Comments Requires Ax1 and walker for safe mobility   Coordination   Coordination other (see comments)   Coordination  "Comments Difficulty reaching due to baseline visual deficits.    Clinical Impression   Criteria for Skilled Therapeutic Intervention evaluation only;no problems identified which require skilled intervention;current level of function same as previous level of function   Clinical Presentation Stable/Uncomplicated   Clinical Presentation Rationale Medically stbale   Clinical Decision Making (Complexity) Low complexity   Anticipated Discharge Disposition Home with Assist   Risk & Benefits of therapy have been explained Yes   Patient, Family & other staff in agreement with plan of care Yes   [employer] \"6 Clicks\"   2016, Trustees of [facility], under license to [employer].  All rights reserved.   6 Clicks Short Forms Basic Mobility Inpatient Short Form   [employer]  \"6 Clicks\" V.2 Basic Mobility Inpatient Short Form   1. Turning from your back to your side while in a flat bed without using bedrails? 3 - A Little   2. Moving from lying on your back to sitting on the side of a flat bed without using bedrails? 3 - A Little   3. Moving to and from a bed to a chair (including a wheelchair)? 3 - A Little   4. Standing up from a chair using your arms (e.g., wheelchair, or bedside chair)? 3 - A Little   5. To walk in hospital room? 3 - A Little   6. Climbing 3-5 steps with a railing? 2 - A Lot   Basic Mobility Raw Score (Score out of 24.Lower scores equate to lower levels of function) 17   Total Evaluation Time   Total Evaluation Time (Minutes) 16     "

## 2018-03-28 NOTE — PROGRESS NOTES
[facility]    Hospitalist Progress Note    Date of Service (when I saw the patient): 03/28/2018    Assessment & Plan   Kylah Conroy is a 89 year old female who was admitted on 3/27/2018 with sepsis due to a UTI.    1. Sepsis due to UTI.  Continue Rocephin.  Urine cultures growing LFGNR.  Sepsis resolved following fluid resuscitation.    2. Bronchitis.  Continue azithromycin.    3. Chronic afib.  Rate controlled.  Continue telemetry.  Not on AC due to fall risk.    4. Metabolic encephalopathy.  Improved this AM.  Likely underlying mild dementia.    5. Hypovolemic hyponatremia.  Resolved with IVF.    6. Chronic diastolic heart failure.  Holding diuretics.  Appears euvolemic.    # Pain Assessment:  Current Pain Score 3/28/2018   Patient currently in pain? denies   Pain score (0-10) -   Pain location -   Pain descriptors -   Kylah hernandez pain level was assessed and she currently denies pain.        DVT Prophylaxis: Pneumatic Compression Devices  Code Status: DNR/DNI    Disposition: Expected discharge in 1-2 days.    Castillo Hummel  Text Page (7 am to 6 pm)    Interval History   The patient sitting comfortably in her room, eating breakfast.  Family states mentation has improved.    -Data reviewed today: I reviewed all new labs and imaging results over the last 24 hours. I personally reviewed no images or EKG's today.    Physical Exam   Temp: 98.6  F (37  C) Temp src: Oral BP: 116/72 Pulse: 98 Heart Rate: 98 Resp: 17 SpO2: 93 % O2 Device: None (Room air)    Vitals:    03/27/18 0931 03/27/18 1300 03/28/18 0700   Weight: 40.8 kg (90 lb) 40.8 kg (89 lb 15.2 oz) 41.5 kg (91 lb 7.9 oz)     Vital Signs with Ranges  Temp:  [97.5  F (36.4  C)-98.6  F (37  C)] 98.6  F (37  C)  Pulse:  [75-98] 98  Heart Rate:  [75-98] 98  Resp:  [16-18] 17  BP: ()/(51-72) 116/72  SpO2:  [92 %-93 %] 93 %  I/O last 3 completed shifts:  In: 890 [P.O.:181; I.V.:709]  Out: -     Gen: Thin, kyphotic elderly female, alert and  oriented x 3, no acute distressed  HEENT: Atraumatic, normocephalic  Lungs: Bilateral rhonchi without wheezes or rales  Heart: Irregular rate and rhythm, no murmurs, gallops, or rubs  GI: Bowel sound normal, no hepatosplenomegaly or masses  Lymph: No lymphadenopathy or edema  Skin: No rashes     Medications     sodium chloride Stopped (03/27/18 1230)     sodium chloride 75 mL/hr at 03/28/18 1402       cefTRIAXone  2 g Intravenous Q24H     azithromycin  500 mg Intravenous Q24H     pantoprazole  40 mg Oral Daily       Data     Recent Labs  Lab 03/28/18  0851 03/27/18  1420 03/27/18  0934   WBC 12.0*  --  10.8   HGB 11.1*  --  11.8   MCV 93  --  93    165 181   INR  --   --  1.09     --  134   POTASSIUM 3.8  --  4.0   CHLORIDE 105  --  99   CO2 26  --  26   BUN 15  --  16   CR 0.78  --  0.83   ANIONGAP 7  --  9   ELO 7.9*  --  8.7   GLC 95  --  200*   ALBUMIN  --   --  3.2*   PROTTOTAL  --   --  7.5   BILITOTAL  --   --  0.6   ALKPHOS  --   --  135   ALT  --   --  15   AST  --   --  31   TROPI  --   --  0.016       No results found for this or any previous visit (from the past 24 hour(s)).

## 2018-03-28 NOTE — PLAN OF CARE
Problem: Patient Care Overview  Goal: Plan of Care/Patient Progress Review  Outcome: No Change  Pt is A&Ox1, pleasant, calm and cooperative. VSS On RA, soft BPs, occ tachy. Tele Afib CVR w/ PVCs. 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 Up with one assist walker/gaitbelt. Up in chair for meals. Incontinent of B/B. IV abx continued. Infrequent nonproductive cough. LS coarse crackles Posterior LLs. Droplet precautions D/c'd, FLU A/B RSV (-). Tolerating Reg diet, assist with feeding. Ph 2.4, replaced, K 3.8, replaced, Mg WDL. Recheck Ph and K in AM. D/c TBD.

## 2018-03-28 NOTE — PLAN OF CARE
Problem: Patient Care Overview  Goal: Plan of Care/Patient Progress Review  Outcome: No Change  RN [id number]   Pt alert, disoriented to time and situation. Blind in L eye and diminished vision in R per family. Pleasant and cooperative. BP soft, 96/51, 104/58, other VSS on RA .  Tele Afib, controlled w/ BBB.  Frequent, non-productive cough. LS diminished and coarse in bases.  IVF@75/hr. Mg+1.4, replaced, recheck at 1900. K+ 4.0, replaced, recheck in AM.  phosphorus  2.6, currently replacing via IV. Incontinent of urine. Droplet Isolation maintained.

## 2018-03-28 NOTE — PLAN OF CARE
Problem: Patient Care Overview  Goal: Plan of Care/Patient Progress Review  Outcome: Improving  Pt oriented to self.VSS on RA. Left eye blind; right eye diminished vision. On Tele afib, CVR w/ BBB.  NS at 75ml/hr. Continues to have a nonproductive cough. LS coarse to bases. On Droplet precaution.  Negative Influenza A&B, negative RSV. Incontinent of bladder. Total care. T&R q2hrs. D/c pending

## 2018-03-28 NOTE — PLAN OF CARE
Problem: Patient Care Overview  Goal: Plan of Care/Patient Progress Review  PT: Orders received, evaluation completed. 89 year old female admitted with sepsis, possible HAP vs. Influenza. Baseline dementia per chart.  Per phone call with AL staff: Patient resides at [facility] in [place]. Walks with 1, DOM and brigetteker. Assist with all ADLs and verbal cues for eating. Pt limited by baseline visual deficits.    Discharge Planner PT   Patient plan for discharge: Not discussed.   Current status: Supine to sit with CGA. Sit to/from stand with CGA. Needs cues for safe hand placement due to baseline visual deficits.  Ambulates with FWW and CGA/min A. Needs assist for safe walker navigation due to baseline visual deficits.   Barriers to return to prior living situation: None identified.   Recommendations for discharge: Return to [facility] with previous supports  Rationale for recommendations: Patient moving at baseline levels, no skilled acute care PT needs. Will complete order. Recommend pt to ambulate 3x/day in thomas with RN staff with walker to maintain strength during stay.        Entered by: Jesse Palmer 03/28/2018 9:05 AM

## 2018-03-29 LAB
ANION GAP SERPL CALCULATED.3IONS-SCNC: 6 MMOL/L (ref 3–14)
BACTERIA SPEC CULT: ABNORMAL
BACTERIA SPEC CULT: ABNORMAL
BUN SERPL-MCNC: 10 MG/DL (ref 7–30)
CALCIUM SERPL-MCNC: 7.8 MG/DL (ref 8.5–10.1)
CHLORIDE SERPL-SCNC: 109 MMOL/L (ref 94–109)
CO2 SERPL-SCNC: 25 MMOL/L (ref 20–32)
CREAT SERPL-MCNC: 0.66 MG/DL (ref 0.52–1.04)
ERYTHROCYTE [DISTWIDTH] IN BLOOD BY AUTOMATED COUNT: 13.7 % (ref 10–15)
GFR SERPL CREATININE-BSD FRML MDRD: 84 ML/MIN/1.7M2
GLUCOSE SERPL-MCNC: 91 MG/DL (ref 70–99)
HCT VFR BLD AUTO: 31.4 % (ref 35–47)
HGB BLD-MCNC: 10.3 G/DL (ref 11.7–15.7)
Lab: ABNORMAL
MAGNESIUM SERPL-MCNC: 1.7 MG/DL (ref 1.6–2.3)
MCH RBC QN AUTO: 30.5 PG (ref 26.5–33)
MCHC RBC AUTO-ENTMCNC: 32.8 G/DL (ref 31.5–36.5)
MCV RBC AUTO: 93 FL (ref 78–100)
PHOSPHATE SERPL-MCNC: 2.4 MG/DL (ref 2.5–4.5)
PLATELET # BLD AUTO: 150 10E9/L (ref 150–450)
POTASSIUM SERPL-SCNC: 3.8 MMOL/L (ref 3.4–5.3)
RBC # BLD AUTO: 3.38 10E12/L (ref 3.8–5.2)
SODIUM SERPL-SCNC: 140 MMOL/L (ref 133–144)
SPECIMEN SOURCE: ABNORMAL
WBC # BLD AUTO: 6 10E9/L (ref 4–11)

## 2018-03-29 PROCEDURE — 84100 ASSAY OF PHOSPHORUS: CPT | Performed by: INTERNAL MEDICINE

## 2018-03-29 PROCEDURE — 25000128 H RX IP 250 OP 636: Performed by: NURSE PRACTITIONER

## 2018-03-29 PROCEDURE — 80048 BASIC METABOLIC PNL TOTAL CA: CPT | Performed by: INTERNAL MEDICINE

## 2018-03-29 PROCEDURE — 25000125 ZZHC RX 250: Performed by: NURSE PRACTITIONER

## 2018-03-29 PROCEDURE — 36415 COLL VENOUS BLD VENIPUNCTURE: CPT | Performed by: INTERNAL MEDICINE

## 2018-03-29 PROCEDURE — 25000128 H RX IP 250 OP 636: Performed by: INTERNAL MEDICINE

## 2018-03-29 PROCEDURE — 83735 ASSAY OF MAGNESIUM: CPT | Performed by: INTERNAL MEDICINE

## 2018-03-29 PROCEDURE — 85027 COMPLETE CBC AUTOMATED: CPT | Performed by: INTERNAL MEDICINE

## 2018-03-29 PROCEDURE — 99232 SBSQ HOSP IP/OBS MODERATE 35: CPT | Performed by: INTERNAL MEDICINE

## 2018-03-29 PROCEDURE — 25000132 ZZH RX MED GY IP 250 OP 250 PS 637: Mod: GY | Performed by: NURSE PRACTITIONER

## 2018-03-29 PROCEDURE — 12000000 ZZH R&B MED SURG/OB

## 2018-03-29 PROCEDURE — 25000125 ZZHC RX 250: Performed by: INTERNAL MEDICINE

## 2018-03-29 PROCEDURE — A9270 NON-COVERED ITEM OR SERVICE: HCPCS | Mod: GY | Performed by: NURSE PRACTITIONER

## 2018-03-29 RX ORDER — POTASSIUM CL/LIDO/0.9 % NACL 10MEQ/0.1L
10 INTRAVENOUS SOLUTION, PIGGYBACK (ML) INTRAVENOUS
Status: DISCONTINUED | OUTPATIENT
Start: 2018-03-29 | End: 2018-03-30 | Stop reason: HOSPADM

## 2018-03-29 RX ORDER — POTASSIUM CHLORIDE 7.45 MG/ML
10 INJECTION INTRAVENOUS
Status: DISCONTINUED | OUTPATIENT
Start: 2018-03-29 | End: 2018-03-30 | Stop reason: HOSPADM

## 2018-03-29 RX ORDER — POTASSIUM CHLORIDE 29.8 MG/ML
20 INJECTION INTRAVENOUS
Status: DISCONTINUED | OUTPATIENT
Start: 2018-03-29 | End: 2018-03-30 | Stop reason: HOSPADM

## 2018-03-29 RX ORDER — MAGNESIUM SULFATE HEPTAHYDRATE 40 MG/ML
4 INJECTION, SOLUTION INTRAVENOUS EVERY 4 HOURS PRN
Status: DISCONTINUED | OUTPATIENT
Start: 2018-03-29 | End: 2018-03-30 | Stop reason: HOSPADM

## 2018-03-29 RX ORDER — POTASSIUM CHLORIDE 1.5 G/1.58G
20-40 POWDER, FOR SOLUTION ORAL
Status: DISCONTINUED | OUTPATIENT
Start: 2018-03-29 | End: 2018-03-30 | Stop reason: HOSPADM

## 2018-03-29 RX ORDER — POTASSIUM CHLORIDE 1500 MG/1
20-40 TABLET, EXTENDED RELEASE ORAL
Status: DISCONTINUED | OUTPATIENT
Start: 2018-03-29 | End: 2018-03-30 | Stop reason: HOSPADM

## 2018-03-29 RX ADMIN — AZITHROMYCIN MONOHYDRATE 500 MG: 500 INJECTION, POWDER, LYOPHILIZED, FOR SOLUTION INTRAVENOUS at 12:17

## 2018-03-29 RX ADMIN — PANTOPRAZOLE SODIUM 40 MG: 40 TABLET, DELAYED RELEASE ORAL at 09:44

## 2018-03-29 RX ADMIN — SODIUM CHLORIDE: 9 INJECTION, SOLUTION INTRAVENOUS at 03:15

## 2018-03-29 RX ADMIN — Medication 2 G: at 10:58

## 2018-03-29 RX ADMIN — POTASSIUM PHOSPHATE, MONOBASIC AND POTASSIUM PHOSPHATE, DIBASIC 15 MMOL: 224; 236 INJECTION, SOLUTION INTRAVENOUS at 14:57

## 2018-03-29 RX ADMIN — POTASSIUM CHLORIDE 20 MEQ: 1500 TABLET, EXTENDED RELEASE ORAL at 09:44

## 2018-03-29 RX ADMIN — CEFTRIAXONE SODIUM 2 G: 2 INJECTION, POWDER, FOR SOLUTION INTRAMUSCULAR; INTRAVENOUS at 09:44

## 2018-03-29 NOTE — PLAN OF CARE
Problem: Patient Care Overview  Goal: Plan of Care/Patient Progress Review  Outcome: No Change  Alert to self and place only. VSS on RA. Tele afib CVR. Congested cough and some bibasilar crackles on auscultation. Pt is a turn/repo and incontinence cares. Skin looks great. Voiding well. On regular diet. Does need assistance with feeding d/t blindness in L eye and poor vision on R side. Reportedly up with 2 strong but did not get out of bed overnight. IVF. Continues Zithromax & Rocephin. D/c back to [facility] when ready.

## 2018-03-29 NOTE — PROGRESS NOTES
D: NORMAN following for DC planning. NORMAN reviewed chart. Pt is from [facility]. Possible DC Friday.   I: NORMAN spoke with VALERIE Rojas at [facility], [phone]. Fax [phone]. Updated him. Appears pt is at/close to baseline. Typically assist of one and uses a wheelchair. On Friday's, [facility] prefers pt's return by 1100.  A: Return to AL where needs are met.   P: NORMAN will follow.     Snehal Cochran MSW, LGSW  [phone]

## 2018-03-29 NOTE — PLAN OF CARE
Problem: Patient Care Overview  Goal: Plan of Care/Patient Progress Review  Outcome: No Change  Pt is A&Ox1-2, pleasant, cooperative. Up with 1-2 assist walker/gaitbelt. VSS on RA, denied pain. LS coarse crackles, wet/congested cough. MD aware, D/c'd IVF. Turn and repo q2h, up in chair for meals. Incontinent of bladder, continent of stool x1 this shift. UC showing klebsiella pneumoniae, MD aware, IV abx continued. Tele Afib CVR, occ tachy with activity. Tele d/c'd. K 3.8, replaced, Mg 1.7, replaced, Ph 2.4 replaced, Recheck in AM. Pt switched to standard electrolyte replacement. Possibly d/c tomorrow back to [facility].

## 2018-03-29 NOTE — PROGRESS NOTES
[facility]    Hospitalist Progress Note    Date of Service (when I saw the patient): 03/29/2018    Assessment & Plan   Kylah Conroy is a 89 year old female who was admitted on 3/27/2018 with sepsis due to a UTI.    1. Sepsis due to UTI.  Continue Rocephin.  Urine cultures growing Klebsiella pneumoniae.  Sepsis resolved following fluid resuscitation.  Patient to remain in hospital due to poor appetite.    2. Bronchitis.  Continue azithromycin.    3. Chronic afib.  Rate controlled.  Discontinue telemetry.  Not on AC due to fall risk.    4. Metabolic encephalopathy.  Resolved.  Likely underlying mild dementia.    5. Hypovolemic hyponatremia.  Resolved with IVF.    6. Chronic diastolic heart failure.  Holding IVF.  Consider resuming diuretics tomorrow if appetite improves.  Monitor closely for volume overload.    # Pain Assessment:  Current Pain Score 3/29/2018   Patient currently in pain? denies   Pain score (0-10) -   Pain location -   Pain descriptors -   Kylah hernandez pain level was assessed and she currently denies pain.        DVT Prophylaxis: Pneumatic Compression Devices  Code Status: DNR/DNI    Disposition: Expected discharge in 1-2 days.    Castillo Hummel  Text Page (7 am to 6 pm)    Interval History   The patient resting in bed.  Appetite remains modest.  No abdominal pain, vomiting or diarrhea.    -Data reviewed today: I reviewed all new labs and imaging results over the last 24 hours. I personally reviewed no images or EKG's today.    Physical Exam   Temp: 97.8  F (36.6  C) Temp src: Oral BP: 131/80   Heart Rate: 76 Resp: 18 SpO2: 91 % O2 Device: None (Room air)    Vitals:    03/27/18 1300 03/28/18 0700 03/29/18 0700   Weight: 40.8 kg (89 lb 15.2 oz) 41.5 kg (91 lb 7.9 oz) 43.2 kg (95 lb 3.8 oz)     Vital Signs with Ranges  Temp:  [97.8  F (36.6  C)-98.4  F (36.9  C)] 97.8  F (36.6  C)  Heart Rate:  [76-82] 76  Resp:  [16-20] 18  BP: (112-131)/(69-85) 131/80  SpO2:  [91 %] 91 %  I/O  last 3 completed shifts:  In: 2984 [P.O.:600; I.V.:2384]  Out: -     Gen: Thin, kyphotic elderly female, alert and oriented x 3, no acute distressed  HEENT: Atraumatic, normocephalic  Lungs: Bilateral rhonchi without wheezes or rales  Heart: Irregular rate and rhythm, no murmurs, gallops, or rubs  GI: Bowel sound normal, no hepatosplenomegaly or masses  Lymph: No lymphadenopathy or edema  Skin: No rashes     Medications       cefTRIAXone  2 g Intravenous Q24H     azithromycin  500 mg Intravenous Q24H     pantoprazole  40 mg Oral Daily       Data     Recent Labs  Lab 03/29/18  0752 03/28/18  0851 03/27/18  1420 03/27/18  0934   WBC 6.0 12.0*  --  10.8   HGB 10.3* 11.1*  --  11.8   MCV 93 93  --  93    163 165 181   INR  --   --   --  1.09    138  --  134   POTASSIUM 3.8 3.8  --  4.0   CHLORIDE 109 105  --  99   CO2 25 26  --  26   BUN 10 15  --  16   CR 0.66 0.78  --  0.83   ANIONGAP 6 7  --  9   ELO 7.8* 7.9*  --  8.7   GLC 91 95  --  200*   ALBUMIN  --   --   --  3.2*   PROTTOTAL  --   --   --  7.5   BILITOTAL  --   --   --  0.6   ALKPHOS  --   --   --  135   ALT  --   --   --  15   AST  --   --   --  31   TROPI  --   --   --  0.016       No results found for this or any previous visit (from the past 24 hour(s)).

## 2018-03-29 NOTE — PLAN OF CARE
Problem: Patient Care Overview  Goal: Plan of Care/Patient Progress Review  Outcome: Improving  Pt A&Ox2-3. Very pleasant and cooperative w/care. VSS. On RA. SaO2 92%. Infreq coarse cough, non-productive. Good appetite with assist in feeding d/t poor sight. Incontinent. 1 BM this shift. Turned Q2. Up in chair for dinner. Strong 2PA with transfer. Pt is weak and unable to support own weight. Sleeping off and on through out shift. Calls as needed. Rounded on frequently. Bed alarm on for safety.

## 2018-03-30 VITALS
WEIGHT: 93.47 LBS | RESPIRATION RATE: 16 BRPM | TEMPERATURE: 98.5 F | SYSTOLIC BLOOD PRESSURE: 140 MMHG | HEIGHT: 60 IN | HEART RATE: 86 BPM | OXYGEN SATURATION: 91 % | BODY MASS INDEX: 18.35 KG/M2 | DIASTOLIC BLOOD PRESSURE: 74 MMHG

## 2018-03-30 LAB
MAGNESIUM SERPL-MCNC: 1.8 MG/DL (ref 1.6–2.3)
PHOSPHATE SERPL-MCNC: 3 MG/DL (ref 2.5–4.5)
PLATELET # BLD AUTO: 194 10E9/L (ref 150–450)
POTASSIUM SERPL-SCNC: 3.8 MMOL/L (ref 3.4–5.3)

## 2018-03-30 PROCEDURE — 84132 ASSAY OF SERUM POTASSIUM: CPT | Performed by: NURSE PRACTITIONER

## 2018-03-30 PROCEDURE — 25000128 H RX IP 250 OP 636: Performed by: NURSE PRACTITIONER

## 2018-03-30 PROCEDURE — 25000132 ZZH RX MED GY IP 250 OP 250 PS 637: Mod: GY | Performed by: NURSE PRACTITIONER

## 2018-03-30 PROCEDURE — 84100 ASSAY OF PHOSPHORUS: CPT | Performed by: NURSE PRACTITIONER

## 2018-03-30 PROCEDURE — 83735 ASSAY OF MAGNESIUM: CPT | Performed by: NURSE PRACTITIONER

## 2018-03-30 PROCEDURE — A9270 NON-COVERED ITEM OR SERVICE: HCPCS | Mod: GY | Performed by: NURSE PRACTITIONER

## 2018-03-30 PROCEDURE — 99239 HOSP IP/OBS DSCHRG MGMT >30: CPT | Performed by: INTERNAL MEDICINE

## 2018-03-30 PROCEDURE — 85049 AUTOMATED PLATELET COUNT: CPT | Performed by: NURSE PRACTITIONER

## 2018-03-30 PROCEDURE — 36415 COLL VENOUS BLD VENIPUNCTURE: CPT | Performed by: NURSE PRACTITIONER

## 2018-03-30 RX ORDER — AZITHROMYCIN 250 MG/1
250 TABLET, FILM COATED ORAL DAILY
Qty: 1 TABLET | Refills: 0 | Status: SHIPPED | OUTPATIENT
Start: 2018-03-31 | End: 2018-04-01

## 2018-03-30 RX ORDER — CEFIXIME 200 MG/5ML
200 POWDER, FOR SUSPENSION ORAL DAILY
Qty: 15 ML | Refills: 0 | Status: SHIPPED | OUTPATIENT
Start: 2018-03-31 | End: 2018-04-03

## 2018-03-30 RX ADMIN — PANTOPRAZOLE SODIUM 40 MG: 40 TABLET, DELAYED RELEASE ORAL at 08:22

## 2018-03-30 RX ADMIN — AZITHROMYCIN MONOHYDRATE 500 MG: 500 INJECTION, POWDER, LYOPHILIZED, FOR SOLUTION INTRAVENOUS at 11:54

## 2018-03-30 RX ADMIN — CEFTRIAXONE SODIUM 2 G: 2 INJECTION, POWDER, FOR SOLUTION INTRAMUSCULAR; INTRAVENOUS at 10:01

## 2018-03-30 RX ADMIN — ACETAMINOPHEN 650 MG: 325 TABLET, FILM COATED ORAL at 14:52

## 2018-03-30 NOTE — PLAN OF CARE
Problem: Patient Care Overview  Goal: Plan of Care/Patient Progress Review  Outcome: Adequate for Discharge Date Met: 03/30/18  A&O X 1.  Cooperative with cares.  Afebrile with VSS.  Up to chair or BSC with 1 assist and walker/gait belt.  Assisted with meals due to low vision.  Discharged to home.  Medications for discharge given to .

## 2018-03-30 NOTE — DISCHARGE SUMMARY
Discharge Summary    Kylah Conroy MRN# [id number]   YOB: 1924 Age: 89 year old     Date of Admission:  3/27/2018  Date of Discharge:  3/30/2018  Admitting Physician:  Castillo Hummel MD  Discharge Physician:  Castillo Hummel MD  Discharging Service:  Hospitalist     Primary Provider: Toya Ley          Admission Diagnoses:   Chronic atrial fibrillation (H) [I48.2]  Severe sepsis (H) [A41.9, R65.20]  Urinary tract infection with hematuria, site unspecified [N39.0, R31.9]          Discharge Diagnosis:   Patient Active Problem List   Diagnosis     GI bleeding     CHF (congestive heart failure) (H)     Sacral fracture (H)     ACP (advance care planning)     Cystitis             Condition on Discharge:       Discharge vitals: Blood pressure 140/74, pulse 86, temperature 98.5  F (36.9  C), temperature source Oral, resp. rate 16, height 1.524 m (5'), weight 42.4 kg (93 lb 7.6 oz), SpO2 91 %.         Gen: Thin, frail, kyphotic elderly female, alert and oriented x 3, no acute distressed  HEENT: Atraumatic, normocephalic  Lungs: Bilateral rhonchi without wheezes or rales  Heart: Irregular rate and rhythm, no murmurs, gallops, or rubs  GI: Bowel sound normal, no hepatosplenomegaly or masses  Lymph: No lymphadenopathy or edema  Skin: No rashes     # Discharge Pain Plan:   - Patient currently has NO PAIN and is not being prescribed pain medications on discharge.            Procedures / Labs / Imaging:   Most Recent 3 CBC's:  Recent Labs   Lab Test  03/30/18   0902 03/29/18   0752 03/28/18   0851   03/27/18   0934   WBC   --   6.0  12.0*   --   10.8   HGB   --   10.3*  11.1*   --   11.8   MCV   --   93  93   --   93   PLT  194  150  163   < >  181    < > = values in this interval not displayed.      Most Recent 3 BMP's:  Recent Labs   Lab Test  03/30/18   0902 03/29/18   0752  03/28/18   0851  03/27/18   0934   NA   --   140  138  134   POTASSIUM  3.8  3.8  3.8  4.0   CHLORIDE   --   109  105   99   CO2   --   25  26  26   BUN   --   10  15  16   CR   --   0.66  0.78  0.83   ANIONGAP   --   6  7  9   ELO   --   7.8*  7.9*  8.7   GLC   --   91  95  200*     Most Recent 3 Troponin's:  Recent Labs   Lab Test  03/27/18   0934  02/29/16   0651  02/29/16   0244   TROPI  0.016  0.342*  0.418*     Most Recent 3 INR's:  Recent Labs   Lab Test  03/27/18   0934  05/07/16   0927  03/07/11   1550   INR  1.09  1.11  1.09     Most Recent 2 LFT's:  Recent Labs   Lab Test  03/27/18   0934  02/24/16   2052   AST  31  31   ALT  15  26   ALKPHOS  135  119   BILITOTAL  0.6  0.8     Most Recent Cholesterol Panel:  Recent Labs   Lab Test  03/08/11   0710   CHOL  148   LDL  77   HDL  61   TRIG  54     Most Recent 6 Bacteria Isolates From Any Culture (See EPIC Reports for Culture Details):  Recent Labs   Lab Test  03/27/18   1010  03/27/18   0945  03/27/18   0934  02/28/16   1637   CULT  No growth after 3 days  >100,000 colonies/mL  Klebsiella pneumoniae  *  >100,000 colonies/mL  Strain 2  Klebsiella pneumoniae  *  No growth after 3 days  No growth     Most Recent TSH, T4 and HgbA1c:   Recent Labs   Lab Test  05/07/16   0927   A1C  5.7     Results for orders placed or performed during the hospital encounter of 03/27/18   XR Chest 2 Views    Narrative    XR CHEST 2 VW 3/27/2018 10:02 AM    HISTORY: Cough.    COMPARISON: 2/28/2016.    FINDINGS: Multiple old right-sided rib injuries. No new consolidation,  pleural effusion or pneumothorax. Stable cardiomegaly.      Impression    IMPRESSION: No acute abnormality.    ROOPA FRANCISCO MD             Medications Prior to Admission:     Prescriptions Prior to Admission   Medication Sig Dispense Refill Last Dose     FUROSEMIDE PO Take 10 mg by mouth every morning   3/27/2018 at Unknown time     ACETAMINOPHEN PO Take 650 mg by mouth 2 times daily   3/27/2018 at Unknown time     DIPHENHYDRAMINE HCL PO Take 25 mg by mouth every 6 hours as needed   Past Week at Unknown time     senna-docusate  (SENOKOT-S;PERICOLACE) 8.6-50 MG per tablet Take 1 tablet by mouth 2 times daily as needed for constipation   Past Month at Unknown time     pantoprazole (PROTONIX) 40 MG enteric coated tablet Take 1 tablet (40 mg) by mouth daily Take 30-60 minutes before a meal. (Patient taking differently: Take 40 mg by mouth daily Take 30-60 minutes before a meal.    Takes at 1600) 30 tablet 1 3/26/2018 at 1600     multivitamin, therapeutic (THERA-VIT) TABS Take 1 tablet by mouth daily   3/27/2018 at Unknown time             Discharge Medications:     Current Discharge Medication List      START taking these medications    Details   azithromycin (ZITHROMAX) 250 MG tablet Take 1 tablet (250 mg) by mouth daily for 1 day  Qty: 1 tablet, Refills: 0    Associated Diagnoses: Acute bronchitis, unspecified organism      cefixime 200 MG/5ML SUSR Take 200 mg by mouth daily for 3 days  Qty: 15 mL, Refills: 0    Associated Diagnoses: Urinary tract infection with hematuria, site unspecified         CONTINUE these medications which have NOT CHANGED    Details   FUROSEMIDE PO Take 10 mg by mouth every morning      ACETAMINOPHEN PO Take 650 mg by mouth 2 times daily      DIPHENHYDRAMINE HCL PO Take 25 mg by mouth every 6 hours as needed      senna-docusate (SENOKOT-S;PERICOLACE) 8.6-50 MG per tablet Take 1 tablet by mouth 2 times daily as needed for constipation      pantoprazole (PROTONIX) 40 MG enteric coated tablet Take 1 tablet (40 mg) by mouth daily Take 30-60 minutes before a meal.  Qty: 30 tablet, Refills: 1    Associated Diagnoses: Hematochezia      multivitamin, therapeutic (THERA-VIT) TABS Take 1 tablet by mouth daily                   Brief History of Illness:   Kylah Conroy is a 89 year old female who was admitted for altered mental status.          Hospital Course:   This is a 89-year-old female who was noted to have some confusion and slurred speech on day of discharge.  She is brought in the emergency room and found to have a  urinary tract infection.  She was treated with Rocephin.  Urine cultures grew Klebsiella pneumoniae.  Lactate was elevated with 2.6.  Sepsis resolved with fluid resuscitation and follow-up lactate was normal at 1.8.  Patient was placed on telemetry due to history of atrial fibrillation.  She remained rate control throughout her hospitalization.  Patient was also noted to have a coarse and productive cough.  She was initiated on azithromycin for possible bronchitis.  Patient was gently fluid resuscitated with normal saline.  Patient's mentation cleared and her appetite improved.  She was discharged back to assisted living facility.    Greater than 35 minutes were spent in discharge planning.             Pending Results:   Unresulted Labs Ordered in the Past 30 Days of this Admission     Date and Time Order Name Status Description    3/27/2018 0932 Blood culture Preliminary     3/27/2018 0932 Blood culture Preliminary

## 2018-03-30 NOTE — PROGRESS NOTES
D: NORMAN following for DC planning.   I: Pt will DC today back to [facility]. [facility] BLS ride at 1530. PCS faxed. Orders faxed and Peter updated. Dtr here and nursing has updated her.    P: DC today.     Snehal Cochran, [place], LGSW  [phone]

## 2018-03-30 NOTE — PLAN OF CARE
Problem: Patient Care Overview  Goal: Plan of Care/Patient Progress Review  Outcome: Improving  Pt A/Ox1, oriented to self. Assist x1 with walker and gait belt. VSS on RA. No complaints of pain. Reposition q2h. Incontinent of bowel and bladder. Lung sounds diminished with crackles. Infrequent nonproductive cough. Phos recheck in the AM. D/C 1-2 days pending progress.

## 2018-04-02 LAB
BACTERIA SPEC CULT: NO GROWTH
BACTERIA SPEC CULT: NO GROWTH
Lab: NORMAL
Lab: NORMAL
SPECIMEN SOURCE: NORMAL
SPECIMEN SOURCE: NORMAL

## 2018-04-05 ENCOUNTER — DOCUMENTATION ONLY (OUTPATIENT)
Dept: OTHER | Facility: CLINIC | Age: 83
End: 2018-04-05

## 2018-04-05 DIAGNOSIS — Z71.89 ACP (ADVANCE CARE PLANNING): Chronic | ICD-10-CM

## 2018-05-25 ENCOUNTER — DOCUMENTATION ONLY (OUTPATIENT)
Dept: OTHER | Facility: CLINIC | Age: 83
End: 2018-05-25

## 2019-09-28 ENCOUNTER — APPOINTMENT (OUTPATIENT)
Dept: SPEECH THERAPY | Facility: CLINIC | Age: 84
DRG: 065 | End: 2019-09-28
Attending: INTERNAL MEDICINE
Payer: MEDICARE

## 2019-09-28 ENCOUNTER — APPOINTMENT (OUTPATIENT)
Dept: CT IMAGING | Facility: CLINIC | Age: 84
DRG: 065 | End: 2019-09-28
Attending: EMERGENCY MEDICINE
Payer: MEDICARE

## 2019-09-28 ENCOUNTER — HOSPITAL ENCOUNTER (INPATIENT)
Facility: CLINIC | Age: 84
LOS: 4 days | Discharge: HOSPICE | DRG: 065 | End: 2019-10-02
Attending: EMERGENCY MEDICINE | Admitting: INTERNAL MEDICINE
Payer: MEDICARE

## 2019-09-28 ENCOUNTER — APPOINTMENT (OUTPATIENT)
Dept: MRI IMAGING | Facility: CLINIC | Age: 84
DRG: 065 | End: 2019-09-28
Attending: INTERNAL MEDICINE
Payer: MEDICARE

## 2019-09-28 DIAGNOSIS — Z51.5 END OF LIFE CARE: Primary | ICD-10-CM

## 2019-09-28 DIAGNOSIS — I63.9 CEREBROVASCULAR ACCIDENT (CVA), UNSPECIFIED MECHANISM (H): ICD-10-CM

## 2019-09-28 LAB
ANION GAP SERPL CALCULATED.3IONS-SCNC: 6 MMOL/L (ref 3–14)
APTT PPP: 30 SEC (ref 22–37)
BASOPHILS # BLD AUTO: 0 10E9/L (ref 0–0.2)
BASOPHILS NFR BLD AUTO: 0.2 %
BUN SERPL-MCNC: 17 MG/DL (ref 7–30)
CALCIUM SERPL-MCNC: 9 MG/DL (ref 8.5–10.1)
CHLORIDE SERPL-SCNC: 105 MMOL/L (ref 94–109)
CHOLEST SERPL-MCNC: 161 MG/DL
CO2 SERPL-SCNC: 28 MMOL/L (ref 20–32)
CREAT SERPL-MCNC: 0.68 MG/DL (ref 0.52–1.04)
DIFFERENTIAL METHOD BLD: NORMAL
EOSINOPHIL # BLD AUTO: 0.1 10E9/L (ref 0–0.7)
EOSINOPHIL NFR BLD AUTO: 0.6 %
ERYTHROCYTE [DISTWIDTH] IN BLOOD BY AUTOMATED COUNT: 13.8 % (ref 10–15)
GFR SERPL CREATININE-BSD FRML MDRD: 74 ML/MIN/{1.73_M2}
GLUCOSE SERPL-MCNC: 132 MG/DL (ref 70–99)
HBA1C MFR BLD: 6 % (ref 0–5.6)
HCT VFR BLD AUTO: 38.8 % (ref 35–47)
HDLC SERPL-MCNC: 57 MG/DL
HGB BLD-MCNC: 12.6 G/DL (ref 11.7–15.7)
IMM GRANULOCYTES # BLD: 0 10E9/L (ref 0–0.4)
IMM GRANULOCYTES NFR BLD: 0.1 %
INR PPP: 1.07 (ref 0.86–1.14)
INTERPRETATION ECG - MUSE: NORMAL
LDLC SERPL CALC-MCNC: 94 MG/DL
LMWH PPP CHRO-ACNC: 0.16 IU/ML
LYMPHOCYTES # BLD AUTO: 1.9 10E9/L (ref 0.8–5.3)
LYMPHOCYTES NFR BLD AUTO: 20.3 %
MCH RBC QN AUTO: 30.6 PG (ref 26.5–33)
MCHC RBC AUTO-ENTMCNC: 32.5 G/DL (ref 31.5–36.5)
MCV RBC AUTO: 94 FL (ref 78–100)
MONOCYTES # BLD AUTO: 0.7 10E9/L (ref 0–1.3)
MONOCYTES NFR BLD AUTO: 7.7 %
NEUTROPHILS # BLD AUTO: 6.7 10E9/L (ref 1.6–8.3)
NEUTROPHILS NFR BLD AUTO: 71.1 %
NONHDLC SERPL-MCNC: 104 MG/DL
NRBC # BLD AUTO: 0 10*3/UL
NRBC BLD AUTO-RTO: 0 /100
PLATELET # BLD AUTO: 249 10E9/L (ref 150–450)
POTASSIUM SERPL-SCNC: 3.7 MMOL/L (ref 3.4–5.3)
RBC # BLD AUTO: 4.12 10E12/L (ref 3.8–5.2)
SODIUM SERPL-SCNC: 139 MMOL/L (ref 133–144)
TRIGL SERPL-MCNC: 52 MG/DL
WBC # BLD AUTO: 9.7 10E9/L (ref 4–11)

## 2019-09-28 PROCEDURE — 70553 MRI BRAIN STEM W/O & W/DYE: CPT

## 2019-09-28 PROCEDURE — A9585 GADOBUTROL INJECTION: HCPCS | Performed by: INTERNAL MEDICINE

## 2019-09-28 PROCEDURE — 99291 CRITICAL CARE FIRST HOUR: CPT | Mod: 25

## 2019-09-28 PROCEDURE — 36415 COLL VENOUS BLD VENIPUNCTURE: CPT | Performed by: INTERNAL MEDICINE

## 2019-09-28 PROCEDURE — 12000047 ZZH R&B IMCU

## 2019-09-28 PROCEDURE — 92610 EVALUATE SWALLOWING FUNCTION: CPT | Mod: GN

## 2019-09-28 PROCEDURE — 25000128 H RX IP 250 OP 636: Performed by: EMERGENCY MEDICINE

## 2019-09-28 PROCEDURE — 25000125 ZZHC RX 250: Performed by: EMERGENCY MEDICINE

## 2019-09-28 PROCEDURE — 85730 THROMBOPLASTIN TIME PARTIAL: CPT | Performed by: EMERGENCY MEDICINE

## 2019-09-28 PROCEDURE — 25500064 ZZH RX 255 OP 636: Performed by: INTERNAL MEDICINE

## 2019-09-28 PROCEDURE — 83036 HEMOGLOBIN GLYCOSYLATED A1C: CPT | Performed by: INTERNAL MEDICINE

## 2019-09-28 PROCEDURE — 85520 HEPARIN ASSAY: CPT | Performed by: INTERNAL MEDICINE

## 2019-09-28 PROCEDURE — 93005 ELECTROCARDIOGRAM TRACING: CPT

## 2019-09-28 PROCEDURE — 99223 1ST HOSP IP/OBS HIGH 75: CPT | Mod: AI | Performed by: INTERNAL MEDICINE

## 2019-09-28 PROCEDURE — 80048 BASIC METABOLIC PNL TOTAL CA: CPT | Performed by: EMERGENCY MEDICINE

## 2019-09-28 PROCEDURE — 00000146 ZZHCL STATISTIC GLUCOSE BY METER IP

## 2019-09-28 PROCEDURE — 70450 CT HEAD/BRAIN W/O DYE: CPT

## 2019-09-28 PROCEDURE — 85610 PROTHROMBIN TIME: CPT | Performed by: EMERGENCY MEDICINE

## 2019-09-28 PROCEDURE — 0042T CT HEAD PERFUSION WITH CONTRAST: CPT

## 2019-09-28 PROCEDURE — 96376 TX/PRO/DX INJ SAME DRUG ADON: CPT

## 2019-09-28 PROCEDURE — 70498 CT ANGIOGRAPHY NECK: CPT

## 2019-09-28 PROCEDURE — 80061 LIPID PANEL: CPT | Performed by: INTERNAL MEDICINE

## 2019-09-28 PROCEDURE — 99221 1ST HOSP IP/OBS SF/LOW 40: CPT | Mod: GC | Performed by: PSYCHIATRY & NEUROLOGY

## 2019-09-28 PROCEDURE — 99292 CRITICAL CARE ADDL 30 MIN: CPT

## 2019-09-28 PROCEDURE — 85025 COMPLETE CBC W/AUTO DIFF WBC: CPT | Performed by: EMERGENCY MEDICINE

## 2019-09-28 PROCEDURE — 96365 THER/PROPH/DIAG IV INF INIT: CPT | Mod: 59

## 2019-09-28 RX ORDER — LIDOCAINE 40 MG/G
CREAM TOPICAL
Status: DISCONTINUED | OUTPATIENT
Start: 2019-09-28 | End: 2019-10-02 | Stop reason: HOSPADM

## 2019-09-28 RX ORDER — HYDRALAZINE HYDROCHLORIDE 20 MG/ML
10-20 INJECTION INTRAMUSCULAR; INTRAVENOUS
Status: DISCONTINUED | OUTPATIENT
Start: 2019-09-28 | End: 2019-10-01

## 2019-09-28 RX ORDER — NALOXONE HYDROCHLORIDE 0.4 MG/ML
.1-.4 INJECTION, SOLUTION INTRAMUSCULAR; INTRAVENOUS; SUBCUTANEOUS
Status: DISCONTINUED | OUTPATIENT
Start: 2019-09-28 | End: 2019-10-02 | Stop reason: HOSPADM

## 2019-09-28 RX ORDER — GADOBUTROL 604.72 MG/ML
4 INJECTION INTRAVENOUS ONCE
Status: COMPLETED | OUTPATIENT
Start: 2019-09-28 | End: 2019-09-28

## 2019-09-28 RX ORDER — DIPHENHYDRAMINE HCL 25 MG
25 TABLET ORAL EVERY 6 HOURS PRN
COMMUNITY

## 2019-09-28 RX ORDER — PANTOPRAZOLE SODIUM 20 MG/1
20 TABLET, DELAYED RELEASE ORAL DAILY
COMMUNITY

## 2019-09-28 RX ORDER — AMOXICILLIN 250 MG
1 CAPSULE ORAL AT BEDTIME
Status: ON HOLD | COMMUNITY
End: 2019-10-02

## 2019-09-28 RX ORDER — ACETAMINOPHEN 325 MG/1
650 TABLET ORAL EVERY 12 HOURS
Status: ON HOLD | COMMUNITY
End: 2019-10-02

## 2019-09-28 RX ORDER — LABETALOL HYDROCHLORIDE 5 MG/ML
10-40 INJECTION, SOLUTION INTRAVENOUS EVERY 10 MIN PRN
Status: DISCONTINUED | OUTPATIENT
Start: 2019-09-28 | End: 2019-10-01

## 2019-09-28 RX ORDER — ROSUVASTATIN CALCIUM 20 MG/1
20 TABLET, COATED ORAL DAILY
Status: DISCONTINUED | OUTPATIENT
Start: 2019-09-28 | End: 2019-10-01

## 2019-09-28 RX ORDER — IOPAMIDOL 755 MG/ML
120 INJECTION, SOLUTION INTRAVASCULAR ONCE
Status: COMPLETED | OUTPATIENT
Start: 2019-09-28 | End: 2019-09-28

## 2019-09-28 RX ORDER — FUROSEMIDE 20 MG
10 TABLET ORAL DAILY
Status: ON HOLD | COMMUNITY
End: 2019-10-02

## 2019-09-28 RX ADMIN — SODIUM CHLORIDE 100 ML: 9 INJECTION, SOLUTION INTRAVENOUS at 10:04

## 2019-09-28 RX ADMIN — IOPAMIDOL 120 ML: 755 INJECTION, SOLUTION INTRAVENOUS at 10:04

## 2019-09-28 RX ADMIN — HEPARIN SODIUM 12 UNITS/KG/HR: 10000 INJECTION, SOLUTION INTRAVENOUS at 11:35

## 2019-09-28 RX ADMIN — GADOBUTROL 4 ML: 604.72 INJECTION INTRAVENOUS at 19:28

## 2019-09-28 ASSESSMENT — ENCOUNTER SYMPTOMS
FEVER: 0
FACIAL ASYMMETRY: 1
WEAKNESS: 1
NAUSEA: 0
SPEECH DIFFICULTY: 1
HEADACHES: 0

## 2019-09-28 ASSESSMENT — ACTIVITIES OF DAILY LIVING (ADL)
DRESS: 2-->ASSISTIVE PERSON
SWALLOWING: 0-->SWALLOWS FOODS/LIQUIDS WITHOUT DIFFICULTY
RETIRED_COMMUNICATION: 0-->UNDERSTANDS/COMMUNICATES WITHOUT DIFFICULTY
BATHING: 3-->ASSISTIVE EQUIPMENT AND PERSON
RETIRED_EATING: 2-->ASSISTIVE PERSON
ADLS_ACUITY_SCORE: 32
ADLS_ACUITY_SCORE: 35
TOILETING: 3-->ASSISTIVE EQUIPMENT AND PERSON

## 2019-09-28 NOTE — ED NOTES
Bed: ST01  Expected date:   Expected time:   Means of arrival:   Comments:  E2  89 F stroke sxs, marquisew 2000  3001

## 2019-09-28 NOTE — PLAN OF CARE
Discharge Planner SLP   Patient plan for discharge: Not stated this date.   Current status: Orders received and bedside swallow eval completed. Family members present x4. Pt unable to complete oral [facility] exam; unable to follow commands likely impacted by level of fatigue. Pt presents with severe oropharyngeal dysphagia characterized by left sided weakness/facial droop, slowed A/P movement, reduced lingual/labial coordination, delayed swallow initiation, suspected premature entry of liquids. SLP assessed thin (x1 ice chip) and nectar thick (x2 tsp) liquids with immediate cough and throat clearing response with increased rate of breathing. Pt progressively fatigued as trials increased, resulting in termination of evaluation due to increased safety/aspiration risk. Per discussion with family members, pt seen by SLP with previous TIA, however has not been on any diet modifications.     Recommend continuation of strict NPO at this time. Provide oral cares with limited water/moisture on toothette. SLP provided verbal education to family re: oral cares, NPO recommendation, s/sx and increased risk of aspiration. Family verbalized understanding and agreement with recommendations.   Barriers to return to prior living situation: aspiration risk, acuity of illness, dysphagia.   Recommendations for discharge: TCU  Rationale for recommendations: Skilled ST intervention indicated at next level of care to manage dysphagia and return to safe po intake as able.        Entered by: Annette Pan 09/28/2019 2:20 PM

## 2019-09-28 NOTE — PHARMACY-ADMISSION MEDICATION HISTORY
Admission medication history interview status for the 9/28/2019  admission is complete. See EPIC admission navigator for prior to admission medications     Medication history source reliability:Good, [phone]    Actions taken by pharmacist (provider contacted, etc):None     Additional medication history information not noted on PTA med list :None    Medication reconciliation/reorder completed by provider prior to medication history? No    Time spent in this activity: 15 minutes    Prior to Admission medications    Medication Sig Last Dose Taking? Auth Provider   acetaminophen (TYLENOL) 325 MG tablet Take 650 mg by mouth every 12 hours Takes at 0800 and 2000. 9/28/2019 at 0800 Yes Unknown, Entered By History   diphenhydrAMINE (BENADRYL) 25 MG tablet Take 25 mg by mouth every 6 hours as needed for itching or allergies Past Week at Unknown time Yes Unknown, Entered By History   furosemide (LASIX) 20 MG tablet Take 10 mg by mouth daily 9/28/2019 at morning Yes Unknown, Entered By History   multivitamin, therapeutic (THERA-VIT) TABS Take 1 tablet by mouth daily 9/28/2019 at morning Yes Unknown, Entered By History   pantoprazole (PROTONIX) 20 MG EC tablet Take 40 mg by mouth daily Takes at 1600 9/27/2019 at 1600 Yes Unknown, Entered By History   senna-docusate (SENOKOT-S/PERICOLACE) 8.6-50 MG tablet Take 1 tablet by mouth At Bedtime Must be 8 hours after a PRN dose. 9/27/2019 at Bedtime Yes Unknown, Entered By History   senna-docusate (SENOKOT-S;PERICOLACE) 8.6-50 MG per tablet Take 1 tablet by mouth 2 times daily as needed for constipation Past Week at Unknown time Yes Unknown, Entered By History

## 2019-09-28 NOTE — ED TRIAGE NOTES
Pt lives in NH. 0830 staff noticed pt wasn't transferring like normal and had left facial droop, left arm weakness and slurred speech. Last known well time was 8pm last night.

## 2019-09-28 NOTE — PLAN OF CARE
Alert. Legally blind. Oriented to self. Difficult to assess d/t slurred, garbled speech. VSS on RA. Continuous pulse oximetry in place. Tele afib with CVR. Heparin infusing. Turn and repo every 2 hours. Skin intact except pre-existing scabbing skin tear on left shin; mepilex placed. VS and neuro assessments every 2 hours. Left facial droop. LLE and LUE sensation absent. LUE slight contraction. LLE strength improved from 1/5 to 3/5. Full strength and mobility to RUE and RLE. NPO. Lung sounds diminished. Encouraged to cough. MRI in process. Continue to monitor.

## 2019-09-28 NOTE — CONSULTS
[facility]    Stroke Consult Note    Reason for Consult: Left side weakness    Chief Complaint: One-sided Weakness      HPI  Kylah Conroy is a 89 year old female who was brought to the [facility] ED for evaluation of left sided weakness. She resides at an assisted living facility where staff reports she was last known well at 8 pm last night. When staff assisted her in getting out of bed this morning at 8:30, they noted she was unable to transfer into her wheelchair and appeared to be leaning to the left side. She was also found to have a left facial droop and slurred speech at this time. The patient was asked when she noticed these deficits and said that she didn't know. Staff at the facility report that she is wheelchair bound and legally blind and requires assistance with all ADLs.      She has a past medical history significant for atrial fibrillation not on anticoagulation, GI bleeding, and TIA. Stroke code was called on arrival. Presenting BP was 170/100. Initial CT was negative for acute pathology. CTA head/neck revealed acute occlusion of the right vertebral artery with thrombus protruding into the right subclavian. CTP demonstrated probable small cortical infarct in the right parietal lobe and delayed time to drain in the posterior circulation. She will be admitted for further evaluation and treatment.     TPA Treatment   Not given due to outside the time window.    Endovascular Treatment  Proximal vessel occlusion present, but endovascular treatment not initiated due to difficulty accessing the posterior circulation.    Impression  Ischemic Stroke due to cardioembolism    Recommendations  Acute Ischemic Stroke (without tPA) Recommendations  - Neurochecks Q2 hour  - Permissive HTN; labetalol PRN for SBP > 200  - Heparin drip, low intensity nomogram, no boluses   - Start statin treatment   - MRI Stroke Protocol  - TTE with Bubble Study  - Telemetry, EKG  - Bedside Glucose Monitoring  -  Nutrition: NPO until formal swallow evaluation. Will likely need an NG tube at least temporarily due to risk for significant dysphagia  - A1c, Lipid Panel, Troponin x 3  - PT/OT/SLP  - PM&R  - Stroke Education  - Euthermia, Euglycemia    Patient Follow-up    - final recommendation pending work-up    Thank you for this consult. We will continue to follow.     The Stroke Staff is Dr. Soria.    Nirali Dahl MD  NeuroCritical Care Fellow  ______________________________________________________    Past Medical History   Past Medical History:   Diagnosis Date     Atrial fibrillation (H)      Rectal bleeding      Unspecified cerebral artery occlusion with cerebral infarction      Past Surgical History   Past Surgical History:   Procedure Laterality Date     APPENDECTOMY       Medications   Home Meds  Prior to Admission medications    Medication Sig Start Date End Date Taking? Authorizing Provider   acetaminophen (TYLENOL) 325 MG tablet Take 650 mg by mouth every 12 hours Takes at 0800 and 2000.   Yes Unknown, Entered By History   diphenhydrAMINE (BENADRYL) 25 MG tablet Take 25 mg by mouth every 6 hours as needed for itching or allergies   Yes Unknown, Entered By History   furosemide (LASIX) 20 MG tablet Take 10 mg by mouth daily   Yes Unknown, Entered By History   multivitamin, therapeutic (THERA-VIT) TABS Take 1 tablet by mouth daily   Yes Unknown, Entered By History   pantoprazole (PROTONIX) 20 MG EC tablet Take 20 mg by mouth daily Takes at 1600    Yes Unknown, Entered By History   senna-docusate (SENOKOT-S/PERICOLACE) 8.6-50 MG tablet Take 1 tablet by mouth At Bedtime Must be 8 hours after a PRN dose.   Yes Unknown, Entered By History   senna-docusate (SENOKOT-S;PERICOLACE) 8.6-50 MG per tablet Take 1 tablet by mouth 2 times daily as needed for constipation   Yes Unknown, Entered By History       Scheduled Meds    heparin 100 unit/mL in 0.45% NaCl  12 Units/kg/hr Intravenous Once     heparin Loading Dose  60  Units/kg Intravenous Once       Infusion Meds      PRN Meds      Allergies   No Known Allergies  Family History   No family history on file.  Social History   Social History     Tobacco Use     Smoking status: Never Smoker   Substance Use Topics     Alcohol use: Yes     Comment: wine daily     Drug use: No       Review of Systems   Review of systems not obtained due to patient factors - language barrier       PHYSICAL EXAMINATION  Temp:  [98.3  F (36.8  C)] 98.3  F (36.8  C)  Pulse:  [99] 99  Resp:  [16] 16  BP: (170)/(100) 170/100  SpO2:  [97 %] 97 %     General:  patient lying in bed without any acute distress    HEENT:  normocephalic/atraumatic  Cardio:  RRR  Pulmonary:  no respiratory distress  Abdomen:  soft  Extremities:  no edema  Skin: bandage over the left shin over an ulcer     Neurologic  Mental Status:  alert, oriented x 3, follows commands, naming and repetition normal  Cranial Nerves:  EOMI with normal smooth pursuit, facial sensation intact and symmetric, hearing not formally tested but intact to conversation, severe dysarthria, left facial paresis, bilateral hemianopia and can only see outlines   Motor:  minimal muscle bulk throughout, left upper extremity plegic, right upper extremity antigravity, left lower extremity drifts down against gravity but does not hit the bed, right upper extremity is fully antigravity  Sensory:  reduced sendation of the left hemibody   Coordination:  difficult to test due to reduced visual acuity   Station/Gait:  deferred      Dysphagia Screen  Per Nursing    Stroke Scales    NIHSS  Interval transfer(ED>73) (09/28/19 1300)   Interval Comments     1a. Level of Consciousness 0-->Alert, keenly responsive   1b. LOC Questions 2-->Answers neither question correctly   1c. LOC Commands 0-->Performs both tasks correctly   2.   Best Gaze 0-->Normal   3.   Visual 3-->Bilateral hemianopia (blind including cortical blindness)   4.   Facial Palsy 2-->Partial paralysis (total or  near-total paralysis of lower face)   5a. Motor Arm, Left 4-->No movement   5b. Motor Arm, Right 0-->No drift, limb holds 90 (or 45) degrees for full 10 secs   6a. Motor Leg, Left 1-->Drift, leg falls by the end of the 5-sec period but does not hit bed   6b. Motor Leg, right 0-->No drift, leg holds 30 degree position for full 5 secs   7.   Limb Ataxia 0-->Absent   8.   Sensory 2-->Severe to total sensory loss, patient is not aware of being touched in the face, arm, and leg   9.   Best Language 0-->No aphasia, normal   10. Dysarthria 1-->Mild-to-moderate dysarthria, patient slurs at least some words and, at worst, can be understood with some difficulty   11. Extinction and Inattention  1-->Visual, tactile, auditory, spatial, or personal inattention or extinction to bilateral simultaneous stimulation in one of the sensory modalities   Total 16 (09/28/19 1300)       Imaging  I personally reviewed all imaging; relevant findings per HPI.     Lab Results Data   CBC  Recent Labs   Lab 09/28/19  0948   WBC 9.7   RBC 4.12   HGB 12.6   HCT 38.8        Basic Metabolic Panel    Recent Labs   Lab 09/28/19  0948      POTASSIUM 3.7   CHLORIDE 105   CO2 28   BUN 17   CR 0.68   *   ELO 9.0     Liver Panel  Recent Labs   Lab Test 03/27/18  0934 02/24/16  2052   PROTTOTAL 7.5 7.5   ALBUMIN 3.2* 3.6   BILITOTAL 0.6 0.8   ALKPHOS 135 119   AST 31 31   ALT 15 26     INR  Recent Labs   Lab Test 09/28/19  0948 03/27/18  0934 05/07/16  0927   INR 1.07 1.09 1.11      Lipid ProfileNo lab results found.  A1C  Recent Labs   Lab Test 05/07/16  0927   A1C 5.7     Troponin Susan results for input(s): TROPI in the last 168 hours.       Stroke Code / Stroke Consult Data Data   Stroke Code Data  (for stroke code without tele)  Stroke code activated 09/28/19   0950   First stroke provider response 09/28/19   0950   Last known normal 09/27/19 2000   Time of discovery   (or onset of symptoms) 09/28/19   0930   Head CT read by me  09/28/19   1001   Was stroke code de-escalated?      Yes

## 2019-09-28 NOTE — ED NOTES
"[facility]  ED Nurse Handoff Report    ED Chief complaint: One-sided Weakness      ED Diagnosis:   Final diagnoses:   Cerebrovascular accident (CVA), unspecified mechanism (H)       Code Status: DNR    Allergies: No Known Allergies    Activity level - Baseline/Home:  Stand with Assist  Activity Level - Current:   Unable to Assess    Patient's Preferred language: English     Needed?: No    Isolation: No  Infection: Not Applicable  Bariatric?: No    Vital Signs:   Vitals:    09/28/19 0949 09/28/19 1018   BP: (!) 170/100    Pulse: 99    Resp: 16    Temp:  98.3  F (36.8  C)   TempSrc: Temporal Temporal   SpO2: 97%    Weight: 38.2 kg (84 lb 3.5 oz)        Cardiac Rhythm: ,   Cardiac  Cardiac Rhythm: Atrial fibrillation(with RVR in low 100's)    Pain level: 0-10 Pain Scale: 0    Is this patient confused?: No   Does this patient have a guardian?  No         If yes, is there guardianship documents in the Epic \"Code/ACP\" activity?  N/A         Guardian Notified?  N/A  [place] - Suicide Severity Rating Scale Completed?  Yes  If yes, what color did the patient score?  White    Patient Report: Initial Complaint: Pt presents via EMS with left-sided weakness from a NH. She was last known well last night at 2000 when she went to bed. This morning staff noticed pt had difficulty transferring which is not normal for her. EMS reports weakness in her left arm with slurred speech and a left facial droop. She has normal strength in her legs. The patient has a history of A-fib and is not on any blood thinners.  Focused Assessment: Left facial droop, left arm weakness and slurred speech. Pt answering questions appropriately. Oriented to person and place. Blind in left eye with decreased sight in right eye.  Tests Performed: blood, CT and CT angio  Abnormal Results: see EPIC  Treatments provided: Heparin drip, no bolus per neurology    Family Comments: nephews (1 of them is joint POA) at bedside, niece " (Nuvia-POA) en route    OBS brochure/video discussed/provided to patient/family: N/A              Name of person given brochure if not patient: n/a              Relationship to patient: n/a    ED Medications:   Medications   HEParin drip 25,000 units in 0.45% NaCl 250 mL (has no administration in time range)   iopamidol (ISOVUE-370) solution 120 mL (120 mLs Intravenous Given 9/28/19 1004)   sodium chloride 0.9 % bag 500mL for CT scan flush use (100 mLs Intravenous Given 9/28/19 1004)       Drips infusing?:  Yes-Heparin    For the majority of the shift this patient was Green.   Interventions performed were n/a.    Severe Sepsis OR Septic Shock Diagnosis Present: No    To be done/followed up on inpatient unit:  n/a    ED NURSE PHONE NUMBER: [phone]

## 2019-09-28 NOTE — ED PROVIDER NOTES
History     Chief Complaint:  One-sided Weakness    The history is provided by the patient and the EMS personnel.      Kylah Conroy is a 89 year old female who presents via EMS with one-sided weakness. The patient comes from a nursing home. She was last known well last night at 2000 when she went to bed. This morning the staffing was attempting to transfer her which she is normally able to do with assistance. This morning she was not able to assist with transfer. EMS reports weakness and no  strength in her left arm and slurred speech and a left facial droop. She has normal strength in her bilateral legs. The patient has a history of A-fib and is not on any blood thinners. EMS reports a blood sugar of 136. She denies any chest pain, headache, and nausea.    Allergies:  No known drug allergies.    Medications:    Dyazide  Pradaxa  Digox  Diphenhydramine  Furosemide  Protonix  Senokot-s     Past Medical History:    Atrial fibrillation  Rectal bleeding  Cerebral artery occlusion with cerebral infarction  Cystitis  Sacral fracture  CHF  Mitral valve disorder  Osteoporosis  Arthropathy  Hypertension  Hypertensive heart disease without heart failure    Past Surgical History:    Appendectomy    Family History:    History reviewed. No pertinent family history.    Social History:  Patient is single  Tobacco Use: No  Alcohol Use: Yes  PCP: Toya Ley MD     Review of Systems   Constitutional: Negative for fever.   Cardiovascular: Negative for chest pain.   Gastrointestinal: Negative for nausea.   Neurological: Positive for facial asymmetry, speech difficulty and weakness. Negative for headaches.   All other systems reviewed and are negative.      Physical Exam   First Vitals:  Patient Vitals for the past 24 hrs:   BP Temp Temp src Pulse Heart Rate Resp SpO2 Weight   09/28/19 1110 (!) 175/101 -- -- 100 96 12 96 % --   09/28/19 1018 -- 98.3  F (36.8  C) Temporal -- -- -- -- --   09/28/19 0949 (!) 170/100 --  Temporal 99 -- 16 97 % 38.2 kg (84 lb 3.5 oz)     Physical Exam  Constitutional: Elderly small white female supine.   HENT: No signs of trauma.   Eyes: EOM are normal. Right pupil 3mm and reactive. Left pupil non reactive..   Neck: Normal range of motion. No JVD present. No cervical adenopathy.  Cardiovascular: Regular rhythm.  Exam reveals no gallop and no friction rub.    No murmur heard.  Pulmonary/Chest: Bilateral breath sounds normal. No wheezes, rhonchi or rales.  Abdominal: Soft. No tenderness. No rebound or guarding.   Musculoskeletal: No edema. No tenderness.   Lymphadenopathy: No lymphadenopathy.   Neurological: Awake and alert. Slightly dysarthric speech. Left facial droop. Weakness to the left arm, unable to life from the bed. Sensation intact to light touch.    Skin: Skin is warm and dry. No rash noted. No erythema.     Emergency Department Course   ECG:  @ 1016  Indication: Weakness  Vent. Rate 96 bpm. ID interval * ms. QRS duration 78 ms. QT/QTc 390/492 ms. P-R-T axis * -12 19.   Atrial fibrillation. Anterior infarct, age undetermined.  Read by Dr. Gee.    Imaging:  Radiographic findings were communicated with the patient who voiced understanding of the findings.  CT head perfusion w contrast:  1. Probable small cortical infarct in the lateral right parietal lobe  with no significant penumbra of ischemia.  2. Delayed time to drain in the posterior circulation generally,  likely related to the occluded right vertebral artery. Per radiology read.  CT head w/o contrast:  1. No acute abnormality.  2. Atrophy of the brain. White matter changes consistent with sequelae  of small vessel ischemic disease. This is unchanged.  3. Old infarct in the posteromedial left occipital lobe, new since  2011. Per radiology read.  CTA head neck with contrast:  1. Occlusion of most of the right vertebral artery with thrombus  protruding into the right subclavian artery and into the patent distal  right vertebral  artery V4 segment. This suggests that the occlusion is  acute.  2. Calcified atherosclerotic plaque in the carotid bifurcations and  siphons with tortuous distal cervical internal carotid arteries.  3. Scattered atherosclerotic stenoses in the intracranial arteries,  worse than the left posterior cerebral artery distally. Per radiology read.     Laboratory:  CBC:  WBC 9.7, HGB 12.6,   BMP: Glucose 132, Creatinine 0.68  INR: 1.07  PTT: 30    Interventions:  1005: Isovue 120mL IV  1135: Heparin infusion and bolus    Emergency Department Course:  9:42 AM Nursing notes and vitals reviewed.  I performed an exam of the patient as documented above.   The patient arrived in stabilization room 1 and I began my evaluation.     9:46 AM The stroke team arrived to evaluate the patient.    10:13 AM I consulted with Dr. Mark of radiology regarding the patient.    10:22 AM I consulted with Dr. Mark of radiology regarding the patient.    Findings and plan explained to the patient who consents to admission.   10:50 AM I discussed the patient with Dr. Rehman of the hospitalist service, who will admit the patient to a neuro bed for further monitoring, evaluation, and treatment.    Impression & Plan      Medical Decision Making:  Kylah Conroy is a 89 year old female who presents to the emergency department with acute stroke like symptoms. She was last seen at 8 PM last night and in normal condition but this morning was found to have facial droop and left hand weakness with some slurred speech. The patient denies a headache or chest pain. She has no current bleeding problems. She does have known atrial fibrillation and is not on aspirin or blood thinners. On exam, she has a facial droop, some dysarthric speech and left arm weakness. CT/CTA shows a large amount of clot in the vertebral artery as well as a new infarct in the right parietal cortex. Code stroke was called. The patient was emergently seen by the stroke  team. Given the CT findings, it was decided to place her on a heparin drip. The patient does have a history of a previous GI bleed and she will have to be watch carefully.     Critical Care time:  was 35 minutes for this patient excluding procedures.    Diagnosis:    ICD-10-CM    1. Cerebrovascular accident (CVA), unspecified mechanism (H) I63.9        Disposition:  Admitted to the hospitalist    I, Bradley Aasen, am serving as a scribe on 9/28/2019 at 9:47 AM to personally document services performed by Noe Gee MD based on my observations and the provider's statements to me.          Noe Gee MD  09/28/19 5001

## 2019-09-28 NOTE — PLAN OF CARE
OT: Attempted to see pt for OT eval, RN in room and reports not appropriate for therapy at this time. Will reschedule OT/PT for tomorrow.

## 2019-09-28 NOTE — PROGRESS NOTES
"BEDSIDE SWALLOW EVAL      09/28/19 1413   General Information   Onset Date 09/28/19   Start of Care Date 09/28/19   Referring Physician Dr. Hesham ESPINOSA    Patient Profile Review/OT: Additional Occupational Profile Info See Profile for full history and prior level of function   Patient/Family Goals Statement   (family members present; report no prior dysphagia )   Swallowing Evaluation Bedside swallow evaluation   Behaviorial Observations Lethargic   Mode of current nutrition NPO   Respiratory Status Room air   Comments Per MD: \"Kylah Conroy is a 89 year old female who was brought to the [facility] ED for evaluation of left sided weakness. She resides at an assisted living facility where staff reports she was last known well at 8 pm last night. When staff assisted her in getting out of bed this morning at 8:30, they noted she was unable to transfer into her wheelchair and appeared to be leaning to the left side. She was also found to have a left facial droop and slurred speech at this time. The patient was asked when she noticed these deficits and said that she didn't know. Staff at the facility report that she is wheelchair bound and legally blind and requires assistance with all ADLs. \"   Clinical Swallow Evaluation   Oral Musculature unable to assess due to poor participation/comprehension  (limited ability to follow commands; fatigue likely impacting)   Dentition present and adequate;upper dentures   Mucosal Quality dry   Lingual Strength and Mobility impaired protrusion;impaired anterior elevation;impaired left lateral movement;impaired right lateral movement;impaired coordination   Buccal Strength and Mobility impaired   Oral Musculature Comments   (L sided facial droop )   Additional Documentation Yes   Clinical Swallow Eval: Thin Liquid Texture Trial   Mode of Presentation, Thin Liquids spoon;fed by clinician   Volume of Liquid or Food Presented x1 ice chip   Oral Phase of Swallow Poor AP movement;Premature " "pharyngeal entry   Pharyngeal Phase of Swallow coughing/choking  (weak cough response )   Diagnostic Statement Weak cough response noted secondary to x1 ice chip. Pt able to state \"this is difficult\" during po trials.    Clinical Swallow Eval: Nectar Thick Liquid Texture Trial   Mode of Presentation, Nectar spoon;fed by clinician   Volume of Nectar Presented x2 tsp    Oral Phase, Nectar Poor AP movement;Premature pharyngeal entry   Pharyngeal Phase, Nectar impaired;throat clearing   Diagnostic Statement throat clearing after swallow response. Pt opening mouthly wide after swallow; unsure of attempted action.    General Therapy Interventions   Planned Therapy Interventions Dysphagia Treatment   Dysphagia treatment Modified diet education;Instruction of safe swallow strategies   Intervention Comments severe oropharyngeal dysphagia    Swallow Eval: Clinical Impressions   Skilled Criteria for Therapy Intervention Skilled criteria met.  Treatment indicated.   Functional Assessment Scale (FAS) 1   Treatment Diagnosis severe oropharyngeal dysphagia    Diet texture recommendations NPO   Therapy Frequency Daily   Predicted Duration of Therapy Intervention (days/wks) 1 week    Risks and Benefits of Treatment have been explained. Yes   Patient, family and/or staff in agreement with Plan of Care Yes   Clinical Impression Comments Orders received and bedside swallow eval completed. Family members present x4. Pt unable to complete oral mech exam; unable to follow commands likely impacted by level of fatigue. Pt presents with severe oropharyngeal dysphagia characterized by left sided weakness/facial droop, slowed A/P movement, reduced lingual/labial coordination, delayed swallow initiation, suspected premature entry of liquids. SLP assessed thin (x1 ice chip) and nectar thick (x2 tsp) liquids with immediate cough and throat clearing response with increased rate of breathing. Pt progressively fatigued as trials increased, resulting " in termination of evaluation due to increased safety/aspiration risk. Per discussion with family members, pt seen by SLP with previous TIA, however has not been on any diet modifications. Recommend continuation of strict NPO at this time. Provide oral cares with limited water/moisture on to[place]te. SLP provided verbal education to family re: oral cares, NPO recommendation, s/sx and increased risk of aspiration. Family verbalized understanding and agreement with recommendations.   Total Evaluation Time   Total Evaluation Time (Minutes) 20

## 2019-09-28 NOTE — PROGRESS NOTES
RECEIVING UNIT ED HANDOFF REVIEW    ED Nurse Handoff Report was reviewed by: Angela Mccann RN on September 28, 2019 at 12:23 PM

## 2019-09-28 NOTE — H&P
[facility]    History and Physical - Hospitalist Service       Date of Admission:  9/28/2019    Assessment & Plan   Kylah Conroy is a 89 year old female with a history of atrial fibrillation but not on anticoagulation due to history of GI bleed who presented to the ED on 9/28/2019 with left sided weakness and facial droop.    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  Went to bed at 2000 yesterday feeling well but when awoke noted the deficits.  Given the time tPA was not administered.  Seen by stroke service in the ED.  CTA showed the occlusion of the right vertebral artery with thrombus in to the right subclavian and in to the patent distal right vertebral artery V4 segment.  Also noted to have plaques at carotid bifurcations and scattered atherosclerosis in the intracranial arteries.  CT head showed a possible small cortical infarct in the lateral right parietal lobe   - Admission on telemetry  - Q2h neurochecks for today   - Heparin drip   - Permissive hypertension with PRN Labetalol and Hydralazine for SBP >200 or DBP >105 mmHg as recommended by neurology  - MRI brain ordered  - Echocardiogram with bubble study  - PT/OT/Speech/Socal work consulted   - Neurology consulted and appreciate their recommendations    Atrial fibrillation  H/o GI bleed  Patient is not anticoagulated due to history of GI bleed.  In ED noted to be in atrial fibrillation but rate controlled  - Monitor on telemetry  - Anticoagulation as above        Diet: NPO for now   DVT Prophylaxis: Heparin drip   Donahue Catheter: not present  Code Status: DNR/DNI was confirmed with patient and family     Disposition Plan   Expected discharge: 2 - 3 days, once stroke work up complete.  Likely will need TCU   Entered: Dallas Rehman DO 09/28/2019, 11:21 AM     The patient's care was discussed with the Patient, Patient's Family and  neurology and ED physician.    Dallas Rehman DO  [facility]    ______________________________________________________________________    Chief Complaint   Left sided weakness     History is obtained from the patient and family     History of Present Illness   Kylah Conroy is a 89 year old female who presented to the ED from nursing home with left sided weakness that was noted upon waking up this morning.  Patient was last known well last night at bedtime at 2000.  Currently having weakness in her left upper and lower extremities, facial droop and slurred speech.  No headaches, chest pain, SOB, numbness/tingling, vision changes.  Prior to today had been doing well and no recent illnesses.  No fevers, chills, cough, sore throat, abdominal pain, N/V/D, problems with urination.     Review of Systems    The 10 point Review of Systems is negative other than noted in the HPI     Past Medical History    I have reviewed this patient's medical history and updated it with pertinent information if needed.   Past Medical History:   Diagnosis Date     Atrial fibrillation (H)      Rectal bleeding      Unspecified cerebral artery occlusion with cerebral infarction        Past Surgical History   I have reviewed this patient's surgical history and updated it with pertinent information if needed.  Past Surgical History:   Procedure Laterality Date     APPENDECTOMY         Social History   I have reviewed this patient's social history and updated it with pertinent information if needed.  Social History     Tobacco Use     Smoking status: Never Smoker   Substance Use Topics     Alcohol use: Yes     Comment: wine daily     Drug use: No       Family History   I have reviewed this patient's family history and updated it with pertinent information if needed.   Stroke     Prior to Admission Medications   Prior to Admission Medications   Prescriptions Last Dose Informant Patient Reported? Taking?   acetaminophen  (TYLENOL) 325 MG tablet 9/28/2019 at 0800 Nursing Home Yes Yes   Sig: Take 650 mg by mouth every 12 hours Takes at 0800 and 2000.   diphenhydrAMINE (BENADRYL) 25 MG tablet Past Week at Unknown time Nursing Home Yes Yes   Sig: Take 25 mg by mouth every 6 hours as needed for itching or allergies   furosemide (LASIX) 20 MG tablet 9/28/2019 at morning Nursing Home Yes Yes   Sig: Take 10 mg by mouth daily   multivitamin, therapeutic (THERA-VIT) TABS 9/28/2019 at morning Nursing Home Yes Yes   Sig: Take 1 tablet by mouth daily   pantoprazole (PROTONIX) 20 MG EC tablet 9/27/2019 at 1600  Yes Yes   Sig: Take 20 mg by mouth daily Takes at 1600    senna-docusate (SENOKOT-S/PERICOLACE) 8.6-50 MG tablet 9/27/2019 at Bedtime  Yes Yes   Sig: Take 1 tablet by mouth At Bedtime Must be 8 hours after a PRN dose.   senna-docusate (SENOKOT-S;PERICOLACE) 8.6-50 MG per tablet Past Week at Unknown time Nursing Home Yes Yes   Sig: Take 1 tablet by mouth 2 times daily as needed for constipation      Facility-Administered Medications: None     Allergies   No Known Allergies    Physical Exam   Vital Signs: Temp: 98.3  F (36.8  C) Temp src: Temporal BP: (!) 170/100 Pulse: 99   Resp: 16 SpO2: 97 % O2 Device: None (Room air)    Weight: 84 lbs 3.45 oz    General Appearance: Resting in bed.  NAD   Eyes: EOMI.  Normal conjunctiva  HEENT: Left facial droop noted.  AT.  Moist mucous membranes  Respiratory: Clear to auscultation.  No respiratory distress  Cardiovascular: Irregularly irregular.  No obvious murmurs  GI: Bowel sounds present.  Non-tender   Skin: No obvious rashes.  No cyanosis  Musculoskeletal: No edema.  No calf tenderness  Neurologic: Left facial droop noted.  4/5 strength to left lower extremity.  3/5 strength to left upper extremity  Psychiatric: Alert.  Tearful    Data   Data reviewed today: I reviewed all medications, new labs and imaging results over the last 24 hours. I personally reviewed   EKG:  Atrial fibrillation.  Rate 96  BPMD   CTA Head/Neck and CT head results as below     Recent Labs   Lab 09/28/19  0948   WBC 9.7   HGB 12.6   MCV 94      INR 1.07      POTASSIUM 3.7   CHLORIDE 105   CO2 28   BUN 17   CR 0.68   ANIONGAP 6   ELO 9.0   *     Recent Results (from the past 24 hour(s))   CT Head w/o Contrast    Narrative    CT SCAN OF THE HEAD WITHOUT CONTRAST 9/28/2019 10:05 AM     HISTORY: Code stroke with left-sided weakness. History of transient  ischemic attacks.    TECHNIQUE: Axial images of the head and coronal reformations without  IV contrast material. Radiation dose for this scan was reduced using  automated exposure control, adjustment of the mA and/or kV according  to patient size, or iterative reconstruction technique.    COMPARISON: 3/7/2011 MRI    FINDINGS: There is generalized atrophy of the brain. There is low  attenuation in the white matter of the cerebral hemispheres consistent  with sequelae of small vessel ischemic disease. There is  encephalomalacia in the posteromedial left occipital lobe, new since  2011. There is no evidence of intracranial hemorrhage, mass, acute  infarct or anomaly.     The visualized portions of the sinuses and mastoids appear normal.  There is no evidence of trauma.       Impression    IMPRESSION:   1. No acute abnormality.  2. Atrophy of the brain. White matter changes consistent with sequelae  of small vessel ischemic disease. This is unchanged.  3. Old infarct in the posteromedial left occipital lobe, new since  2011.    VENKAT GOMEZ MD   CTA Head Neck with Contrast    Narrative    CT ANGIOGRAM OF THE HEAD AND NECK WITHOUT AND WITH CONTRAST  9/28/2019  10:05 AM     HISTORY: Code stroke, left-sided weakness and history of transient  ischemic attacks. Weakness and no  strength in the left arm,  slurred speech and left facial droop. Normal strength in the legs.  History of atrial fibrillation.    TECHNIQUE:  Precontrast localizing scans were followed by  CT  angiography with an injection of 70 mL Isovue-370 IV with scans  through the head and neck.  3D post processing was performed, images  were archived to PACS and used in interpretation of this study.   Estimates of carotid stenoses are made relative to the distal internal  carotid artery diameters except as noted. Radiation dose for this scan  was reduced using automated exposure control, adjustment of the mA  and/or kV according to patient size, or iterative reconstruction  technique.    COMPARISON: None.    CT HEAD FINDINGS:  No contrast enhancing lesions.   Cerebral blood  flow is grossly normal.    CT ANGIOGRAM HEAD FINDINGS: Calcified atherosclerotic plaque is seen  in the carotid siphons causing at most mild stenosis. Right vertebral  artery is occluded throughout its length from the origin up to the V4  segment, with thrombus noted in the V4 segment proximally and patency  of the distal right V4 segment and basilar artery. Multiple  atherosclerotic stenoses are seen in the intracranial arteries  distally especially in the left posterior cerebral artery. No  aneurysm. Venous circulation is poorly enhanced.     CT ANGIOGRAM NECK FINDINGS:   Right carotid artery: Mild calcified plaque in the proximal internal  and external carotid arteries. Tortuous distal cervical internal  carotid. No significant stenosis.      Left carotid artery: Mild calcified plaque in the distal common  carotid and proximal internal carotid arteries. Tortuous distal  cervical internal carotid. No significant stenosis.      Vertebral arteries: Occluded right vertebral artery from the origin to  the V4 segment, with thrombus protruding into the right subclavian  artery and into the right V4 segment suggesting that this is acute.  Left vertebral artery arises from the aortic arch and is nondominant,  tortuous and widely patent.      Other findings: None.      Impression    IMPRESSION:   1. Occlusion of most of the right vertebral artery  with thrombus  protruding into the right subclavian artery and into the patent distal  right vertebral artery V4 segment. This suggests that the occlusion is  acute.  2. Calcified atherosclerotic plaque in the carotid bifurcations and  siphons with tortuous distal cervical internal carotid arteries.  3. Scattered atherosclerotic stenoses in the intracranial arteries,  worse than the left posterior cerebral artery distally.    I called the report to Dr. Noe Gee in the emergency room on  9/28/2019 at 10:13 AM.    VENKAT GOMEZ MD   CT Head Perfusion w Contrast    Narrative    CT BRAIN PERFUSION 9/28/2019 10:16 AM    HISTORY: Code stroke, left-sided weakness and left facial droop.  History of previous TIAs and strokes.    TECHNIQUE: Time sequential axial CT images of the head were acquired  during the administration of 70 mL Isovue-370 IV. For the CTA images  of the [place] as well as color perfusion maps of the brain  were created from this time sequential axial source data. Radiation  dose for this scan was reduced using automated exposure control,  adjustment of the mA and/or kV according to patient size, or iterative  reconstruction technique.    COMPARISON: None.    FINDINGS: In the right parietal lobe there is a small focus of  decreased cerebral blood volume and a focus of corresponding delayed  time to drain consistent with an acute cortical infarct. This is most  likely in the territory of the posterior circulation.    Abnormal perfusion is visible in the old infarct in the left occipital  lobe.    There is general delayed time to drain in the posterior temporal lobes  and occipital lobes and the parietal lobes consistent with the  occluded right vertebral artery. There is no other evidence of acute  infarct or ischemia.      Impression    IMPRESSION:   1. Probable small cortical infarct in the lateral right parietal lobe  with no significant penumbra of ischemia.  2. Delayed time to drain  in the posterior circulation generally,  likely related to the occluded right vertebral artery.    I called the report to Dr. Gee in the emergency room at 10:21 AM  9/28/2019.    VENKAT GOMEZ MD

## 2019-09-29 ENCOUNTER — APPOINTMENT (OUTPATIENT)
Dept: CARDIOLOGY | Facility: CLINIC | Age: 84
DRG: 065 | End: 2019-09-29
Attending: INTERNAL MEDICINE
Payer: MEDICARE

## 2019-09-29 ENCOUNTER — APPOINTMENT (OUTPATIENT)
Dept: PHYSICAL THERAPY | Facility: CLINIC | Age: 84
DRG: 065 | End: 2019-09-29
Attending: INTERNAL MEDICINE
Payer: MEDICARE

## 2019-09-29 ENCOUNTER — APPOINTMENT (OUTPATIENT)
Dept: SPEECH THERAPY | Facility: CLINIC | Age: 84
DRG: 065 | End: 2019-09-29
Payer: MEDICARE

## 2019-09-29 LAB
GLUCOSE BLDC GLUCOMTR-MCNC: 87 MG/DL (ref 70–99)
GLUCOSE BLDC GLUCOMTR-MCNC: 93 MG/DL (ref 70–99)
LMWH PPP CHRO-ACNC: 0.29 IU/ML
TROPONIN I SERPL-MCNC: 0.05 UG/L (ref 0–0.04)

## 2019-09-29 PROCEDURE — 00000146 ZZHCL STATISTIC GLUCOSE BY METER IP

## 2019-09-29 PROCEDURE — 97530 THERAPEUTIC ACTIVITIES: CPT | Mod: GP | Performed by: PHYSICAL THERAPIST

## 2019-09-29 PROCEDURE — 36415 COLL VENOUS BLD VENIPUNCTURE: CPT | Performed by: INTERNAL MEDICINE

## 2019-09-29 PROCEDURE — 97162 PT EVAL MOD COMPLEX 30 MIN: CPT | Mod: GP | Performed by: PHYSICAL THERAPIST

## 2019-09-29 PROCEDURE — 92526 ORAL FUNCTION THERAPY: CPT | Mod: GN

## 2019-09-29 PROCEDURE — 99233 SBSQ HOSP IP/OBS HIGH 50: CPT | Performed by: INTERNAL MEDICINE

## 2019-09-29 PROCEDURE — 12000047 ZZH R&B IMCU

## 2019-09-29 PROCEDURE — 93306 TTE W/DOPPLER COMPLETE: CPT | Mod: 26 | Performed by: INTERNAL MEDICINE

## 2019-09-29 PROCEDURE — 84484 ASSAY OF TROPONIN QUANT: CPT | Performed by: NURSE PRACTITIONER

## 2019-09-29 PROCEDURE — 99232 SBSQ HOSP IP/OBS MODERATE 35: CPT | Mod: GC | Performed by: PSYCHIATRY & NEUROLOGY

## 2019-09-29 PROCEDURE — 40000264 ECHOCARDIOGRAM COMPLETE

## 2019-09-29 PROCEDURE — 85520 HEPARIN ASSAY: CPT | Performed by: INTERNAL MEDICINE

## 2019-09-29 PROCEDURE — 40000894 ZZH STATISTIC OT IP EVAL DEFER

## 2019-09-29 RX ORDER — DILTIAZEM HYDROCHLORIDE 5 MG/ML
5 INJECTION INTRAVENOUS EVERY 4 HOURS PRN
Status: DISCONTINUED | OUTPATIENT
Start: 2019-09-29 | End: 2019-10-01

## 2019-09-29 RX ORDER — DEXTROSE MONOHYDRATE, SODIUM CHLORIDE, AND POTASSIUM CHLORIDE 50; 1.49; 4.5 G/1000ML; G/1000ML; G/1000ML
INJECTION, SOLUTION INTRAVENOUS CONTINUOUS
Status: DISCONTINUED | OUTPATIENT
Start: 2019-09-30 | End: 2019-10-01

## 2019-09-29 ASSESSMENT — ACTIVITIES OF DAILY LIVING (ADL)
ADLS_ACUITY_SCORE: 34
ADLS_ACUITY_SCORE: 32
ADLS_ACUITY_SCORE: 32
ADLS_ACUITY_SCORE: 34
ADLS_ACUITY_SCORE: 34
ADLS_ACUITY_SCORE: 32

## 2019-09-29 ASSESSMENT — MIFFLIN-ST. JEOR: SCORE: 664.86

## 2019-09-29 NOTE — PLAN OF CARE
A&Ox3. Neuros: L gaze preference, L droop, L hemiparesis, and absent L hand . VSS. Tele Afib with CVR. NPO. Up with lift. Incontinent of bladder. Denies pain. Heparin gtt at 4.5 ml/hr, recheck this AM. Plan for echo, discharge pending.

## 2019-09-29 NOTE — PROGRESS NOTES
"  [facility]    Stroke Progress Note    Interval Events  No acute events overnight. Per SLP, recommendation is for strict NPO. Today she feels well, is fatigued but interacts appropriately on exam. Updated family at bedside with MRI findings and stroke workup to date.      Stroke Evaluation summarized:  MRI/Head CT: MRI brain with recent ischemic infarcts in the left frontal lobe, right frontal  lobe and left cerebellar hemisphere.  Intracranial Vascular Imaging: CTA head with occlusion of most of the right vertebral artery with thrombus protruding into the right subclavian artery   Cervical Carotid and Vertebral Artery Vascular Imaging: CTA neck with mild plaque at both carotid bifurcations. No significant stenosis  Echocardiogram: Awaiting  EKG/Telemetry: Atrial fibrillation  LDL: 94  A1c: 6.0  Troponin: 0.054  Other testing: Not Applicable    Impression  Ischemic Stroke due to cardioembolism in the setting of known atrial fibrillation    Recommendations  - Okay for Q4 hour neuro checks   - Permissive HTN today, then goal BP <140/90  - LDL 94, okay to continue Crestor 20 mg when able to take PO/NG  - Will likely need an NG tube at least temporarily due to risk for significant dysphagia. Discussed this with family and they plan to discuss with patient to determine wishes   - A1c within goal <7.0  - Telemetry  - Awaiting echo  - Continue heparin drip, will discuss risks/benefits of long term anticoagulation with family  - PT/OT/SLP  - [facility]       Patient Follow-Up  - final recommendation pending work-up    Thank you for this consult.  We will continue to follow.     SATINDER Sal[place], [facility]  Neurology  09/29/2019 8:59 AM  Text Page (8am-5pm)  To page stroke neurology after hours or on a subsequent day, click here: AMCOM  Choose \"On Call\" tab at top, then search dropdown box for \"Neurology Adult\" & press Enter, look for Neuro " ICU/Stroke  ______________________________________________________    Medications   Home Meds  Prior to Admission medications    Medication Sig Start Date End Date Taking? Authorizing Provider   acetaminophen (TYLENOL) 325 MG tablet Take 650 mg by mouth every 12 hours Takes at 0800 and 2000.   Yes Unknown, Entered By History   diphenhydrAMINE (BENADRYL) 25 MG tablet Take 25 mg by mouth every 6 hours as needed for itching or allergies   Yes Unknown, Entered By History   furosemide (LASIX) 20 MG tablet Take 10 mg by mouth daily   Yes Unknown, Entered By History   multivitamin, therapeutic (THERA-VIT) TABS Take 1 tablet by mouth daily   Yes Unknown, Entered By History   pantoprazole (PROTONIX) 20 MG EC tablet Take 20 mg by mouth daily Takes at 1600    Yes Unknown, Entered By History   senna-docusate (SENOKOT-S/PERICOLACE) 8.6-50 MG tablet Take 1 tablet by mouth At Bedtime Must be 8 hours after a PRN dose.   Yes Unknown, Entered By History   senna-docusate (SENOKOT-S;PERICOLACE) 8.6-50 MG per tablet Take 1 tablet by mouth 2 times daily as needed for constipation   Yes Unknown, Entered By History       Scheduled Meds    rosuvastatin  20 mg Oral or NG Tube Daily     sodium chloride (PF)  3 mL Intracatheter Q8H       Infusion Meds    HEParin 450 Units/hr (09/28/19 1400)     - MEDICATION INSTRUCTIONS -       - MEDICATION INSTRUCTIONS -         PRN Meds  hydrALAZINE, labetalol, lidocaine 4%, lidocaine (buffered or not buffered), - MEDICATION INSTRUCTIONS -, melatonin, naloxone, - MEDICATION INSTRUCTIONS -, sodium chloride (PF)       PHYSICAL EXAMINATION  Temp:  [97.4  F (36.3  C)-98.7  F (37.1  C)] 98  F (36.7  C)  Pulse:  [] 88  Heart Rate:  [87-99] 98  Resp:  [9-16] 16  BP: (117-175)/() 117/68  SpO2:  [94 %-97 %] 94 %     Neurologic  Mental Status:  follows commands, alert, oriented to self but doesn't know month or age, can state birth date, speech is slurred,  Cranial Nerves:  EOMI with normal smooth pursuit,  facial sensation intact and symmetric, hearing not formally tested but intact to conversation, tongue protrusion midline, legally blind, left facial droop  Motor:  normal muscle tone and bulk, no abnormal movements, RUE/RLE 4+/5, LUE plegic, LLE antigravity with strength 3/5  Sensory:  decreased sensation to left hemibody  Coordination:  difficult to test due to reduced visual acuity  Station/Gait:  deferred     Imaging  I personally reviewed all imaging; relevant findings per HPI.     Lab Results Data   CBC  Recent Labs   Lab 09/28/19  0948   WBC 9.7   RBC 4.12   HGB 12.6   HCT 38.8        Basic Metabolic Panel    Recent Labs   Lab 09/28/19  0948      POTASSIUM 3.7   CHLORIDE 105   CO2 28   BUN 17   CR 0.68   *   ELO 9.0     Liver Panel  Recent Labs   Lab Test 03/27/18  0934 02/24/16  2052   PROTTOTAL 7.5 7.5   ALBUMIN 3.2* 3.6   BILITOTAL 0.6 0.8   ALKPHOS 135 119   AST 31 31   ALT 15 26     INR  Recent Labs   Lab Test 09/28/19  0948 03/27/18  0934 05/07/16  0927   INR 1.07 1.09 1.11      Lipid Profile  Recent Labs   Lab Test 09/28/19  1322   CHOL 161   HDL 57   LDL 94   TRIG 52     A1C  Recent Labs   Lab Test 09/28/19  1322 05/07/16  0927   A1C 6.0* 5.7     Troponin Susan results for input(s): TROPI in the last 168 hours.

## 2019-09-29 NOTE — PLAN OF CARE
Pt here with multiple acute infarcts. Pt is lethargic, but responds when spoken to. A&Ox2-3. Neuros: L droop, LUE 1/5, LLE 3/5, and absent sensation on LUE/LLE. VSS. Tele Afib CVR, with several short runs of RVR, but not sustained. Strict NPO continued. Up with lift or turn/repo q2hr. Incontinent of bladder and bowel, but no BM today. Denies pain. Pt scoring green on the Aggression Stop Light Tool. Plan for therapies to reassess tomorrow. Discussions started w/ family in regards to possible need for feeding tube and what pt's desires for POC would be. Discharge pending, likely TCU. Nursing will continue to monitor.

## 2019-09-29 NOTE — PLAN OF CARE
Discharge Planner SLP   Patient plan for discharge: Not discussed this date.   Current status: SLP provided oral cares and re-assessed tolerance to po trials of ice chip x2 and x2 tsp of nectar thick liquid. Pt demonstrated immediate attempt to cough following ice chips x2, however unable to produce true cough response. Pt able to slightly clear throat on command given cues. Pt demonstrated wet vocal quality secondary to presentation of 1/4 tsp of nectar thick liquid. Overall, pt presents with extremely weak throat clear and absent cough response. Due to delayed swallow initiation, fatigue and reduced oral-awareness, strongly suspect premature spillage resulting in silent aspiration. Recommend continuation of strict NPO at this time. Perform oral cares with very limited liquid on toothette, only when pt is alert and seated upright. SLP provided verbal education to pt and family members re: s/sx of aspiration, silent aspiration, suspected lack of airway protection. Family verbalized understanding and agreement with recommendations.   Barriers to return to prior living situation: dysphagia, aspiration risk, below baseline   Recommendations for discharge: TCU  Rationale for recommendations: Skilled ST intervention indicated at next level of care to manage dysphagia and progress towards po diet as tolerated.        Entered by: Annette Pan 09/29/2019 12:57 PM

## 2019-09-29 NOTE — PROVIDER NOTIFICATION
"Dr. Rehman paged, \"Pt in afib, starting to have more noticeable runs of RVR, otherwise in 80s-90s. Do you want to start any low dose IV rate controllers? Pt is NPO and not expected to pass SLP eval.\"  "

## 2019-09-29 NOTE — PLAN OF CARE
Discharge Planner PT   Patient plan for discharge: Rehab likely; understood by pt and all family members  Current status: PT evaluation completed and tx initiated. Pt lives at [facility] and receives highest level of care possible. Has all meals provided, has assist for dressing, bathing, eating as needed. Pt requires Ax1 for bed mobility and transfers. Pt was ambulating with 4WW to/from BR in small apartment; ~15ft. Pt now unable to demonstrate sit<>stand with platform walker despite maxAx2; transfers supine>sit with modA x1 and is quickly fatigued. Sits EOB with need for close CGA with significant lateral lean and kyphotic posture.   Barriers to return to prior living situation: level of assist, L sided weakness, L UE flaccidity,   Recommendations for discharge: TCU  Rationale for recommendations: Pt below baseline functional mobility; is typically able to ambulate short distances with Ax1 for guidance (legally blind) and 4WW. Pt now unable to stand despite maxA x2 and platform walker, though does unweight bottom 50%. Pt has assist for all ADLs cares; recommend L UE sling for preventative support and L UE hand positional pan if LOS prolonged. Pt will require lift Ax2 at this time.        Entered by: Brigitte Sepulveda 09/29/2019 5:02 PM

## 2019-09-29 NOTE — PLAN OF CARE
Discharge Planner OT   Patient plan for discharge: unclear  Current status: OT orders received and chart reviewed. Pt from [facility] (highest level of A) and has A w/ all ADL's at [facility], is primarily w/c bound at baseline (does ambulate to/from restroom per PT). Per PT who completed OT screen, pt w/ no significant IP OT needs as pt has all needed cares at current living situation and PT is able to coordinate w/ staff to get pt sling if needed. Will complete OT order.  Barriers to return to prior living situation: defer to PT  Recommendations for discharge: defer to PT  Rationale for recommendations: defer to PT; OT order completed.       Entered by: Angela Erickson 09/29/2019 2:48 PM

## 2019-09-29 NOTE — PROGRESS NOTES
[facility]    Medicine Progress Note - Hospitalist Service       Date of Admission:  9/28/2019  Assessment & Plan   Kylah Conroy is a 89 year old female with a history of atrial fibrillation but not on anticoagulation due to history of GI bleed who presented to the ED on 9/28/2019 with left sided weakness and facial droop.    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  Went to bed at 2000 yesterday feeling well but when awoke noted the deficits.  Given the time tPA was not administered.  Seen by stroke service in the ED.  CTA showed the occlusion of the right vertebral artery with thrombus in to the right subclavian and in to the patent distal right vertebral artery V4 segment.  Also noted to have plaques at carotid bifurcations and scattered atherosclerosis in the intracranial arteries.  CT head showed a possible small cortical infarct in the lateral right parietal lobe   MRI Brain showed recent ischemic infarcts in the left frontal lobe, right frontal lobe and left cerebellar hemisphere   - Monitor telemetry  - Q4h neurochecks  - Continue heparin drip   - Echocardiogram with bubble study is pending   - PT/OT/Speech/Socal work consulted   - Neurology consulted and appreciate their recommendations    Atrial fibrillation  H/o GI bleed  Patient is not anticoagulated due to history of GI bleed.  In ED noted to be in atrial fibrillation but rate controlled  Has had some intermittent RVR while here but rates usually go down on their own   - Monitor on telemetry  - Anticoagulation as above   - Added PRN IV Diltiazem as patient is currently NPO     Goals of Care  Had long discussion with patient family.  They are adamant that the patient would not want tube feeds if needed and depending upon speech evaluation may consider going to hospice  - Will discuss further        Diet: NPO for  Medical/Clinical Reasons Except for: No Exceptions    DVT Prophylaxis: Heparin drip   Donahue Catheter: not present  Code Status: DNR/DNI      Disposition Plan   Expected discharge: TBD.  Will at minimum need TCU if not going to go to hospice.  Awaiting speech evaluation to discuss further   Entered: Dallas Rehman DO 09/29/2019, 12:23 PM       The patient's care was discussed with the Care Coordinator/, Patient and Patient's Family.    Dallas Rehman DO  Hospitalist Service  [facility]    ______________________________________________________________________    Interval History   Patient seen and examined.  No acute events over night.  No change in neurologic status.  No pain at this time or difficulty breathing.  Had goals of care discussion as above.     Data reviewed today: I reviewed all medications, new labs and imaging results over the last 24 hours. I personally reviewed MRI brain results as below     Physical Exam   Vital Signs: Temp: 98  F (36.7  C) Temp src: Oral BP: 117/68 Pulse: 88 Heart Rate: 98 Resp: 16 SpO2: 94 % O2 Device: None (Room air)    Weight: 83 lbs 12.8 oz  General Appearance: Resting comfortably.  NAD  Respiratory: Clear to auscultation.  No respiratory distress on RA   Cardiovascular: Irregularly irregular.  No obvious murmurs  GI: Bowel sounds present.  Non-tender  Skin: No obvious rashes.  No cyanosis  Other: Left facial droop.  No  strength on the left     Data   Recent Labs   Lab 09/29/19  0820 09/28/19  0948   WBC  --  9.7   HGB  --  12.6   MCV  --  94   PLT  --  249   INR  --  1.07   NA  --  139   POTASSIUM  --  3.7   CHLORIDE  --  105   CO2  --  28   BUN  --  17   CR  --  0.68   ANIONGAP  --  6   ELO  --  9.0   GLC  --  132*   TROPI 0.054*  --      Recent Results (from the past 24 hour(s))   MRI Brain w & w/o contrast    Narrative    MRI OF THE BRAIN WITHOUT AND WITH CONTRAST 9/28/2019 7:29 PM     COMPARISON: Head CT 9/28/2019.    HISTORY:   Stroke, follow up.     TECHNIQUE: Axial diffusion-weighted with ADC map, axial T2-weighted  with fat saturation, axial T1-weighted, axial turboFLAIR and coronal  T1-weighted images of the brain were acquired without intravenous  contrast.  Following intravenous administration of gadolinium (4 mL  Gadavist), axial T1-weighted images of the brain were acquired.     FINDINGS: There are recent ischemic infarcts in the superior aspect of  the left cerebellar hemisphere, posterior aspect of the right frontal  lobe and mid left frontal lobe. Given the multiple vascular  territories involved in these infarcts, embolic infarcts from a  central embolic source should be considered.    There is severe diffuse cerebral volume loss. There are extensive  confluent periventricular areas of abnormal T2 signal hyperintensity  in the cerebral white matter bilaterally that are consistent with  sequela of chronic small vessel ischemic disease. Chronic  encephalomalacia in the left occipital lobe again noted.    The ventricles and basal cisterns are within normal limits in  configuration given the degree of cerebral volume loss.  There is no  midline shift.  There are no extra-axial fluid collections.  There is  no evidence for acute intracranial hemorrhage.  There is no abnormal  contrast enhancement in the brain or its coverings.    There is no sinusitis or mastoiditis.      Impression    IMPRESSION:   1. Recent ischemic infarcts in the left frontal lobe, right frontal  lobe and left cerebellar hemisphere consistent with embolic infarcts  from a central source.  2. Diffuse cerebral volume loss and cerebral white matter changes  consistent with chronic small vessel ischemic disease.    PATRICIA VILLAFANA MD

## 2019-09-29 NOTE — PROGRESS NOTES
09/29/19 1120   Quick Adds   Type of Visit Initial PT Evaluation   Living Environment   Lives With facility resident   Living Arrangements residential facility   Home Accessibility no concerns   Transportation Anticipated family or friend will provide   Living Environment Comment Pt lives in the highest level of [facility].    Self-Care   Usual Activity Tolerance fair   Current Activity Tolerance fair   Regular Exercise No   Equipment Currently Used at Home   (4WW)   Activity/Exercise/Self-Care Comment Pt lives in facility with assist for dressing, bathing, feeding, has assist with cleaning.    Functional Level Prior   Ambulation 3-->assistive equipment and person   Transferring 3-->assistive equipment and person   Toileting 3-->assistive equipment and person   Bathing 3-->assistive equipment and person   Communication 2-->difficulty speaking (not related to language barrier)   Cognition 1 - attention or memory deficits   Fall history within last six months no   Which of the above functional risks had a recent onset or change? ambulation;cognition;swallowing;transferring;communication/speech   Prior Functional Level Comment Pt previously requires assist with all cares. Was able to ambulate with Ax1 with 4WW to/from BR.    General Information   Onset of Illness/Injury or Date of Surgery - Date 09/28/19   Referring Physician Dallas Rehman, DO   Patient/Family Goals Statement comfort   Pertinent History of Current Problem (include personal factors and/or comorbidities that impact the POC) Kylah Conroy is a 89 year old female with a history of atrial fibrillation but not on anticoagulation due to history of GI bleed who presented to the ED on 9/28/2019 with left sided weakness and facial droop.   Precautions/Limitations fall precautions   General Observations Legally blind, family present   General Info Comments Eval & tx for L birgit. Up with assist   Cognitive Status Examination   Orientation person;place  "  Level of Consciousness alert   Follows Commands and Answers Questions 75% of the time;able to follow single-step instructions   Personal Safety and Judgment intact   Memory intact   Pain Assessment   Patient Currently in Pain No   Posture    Posture Kyphosis   Range of Motion (ROM)   ROM Comment L UE flacid   Strength   Strength Comments L LE quad 2+/5, hamstring 2/5, hip flexion 2+/5   Bed Mobility   Bed Mobility Comments Min A supine>sit   Transfer Skills   Transfer Comments sit<>stand maxA x2 50% unweight platform walker   Gait   Gait Comments NT   Balance   Balance Comments Meaghan at EOB   Muscle Tone   Muscle Tone other (describe)   Muscle Tone Comments L UE flacid   General Therapy Interventions   Planned Therapy Interventions balance training;bed mobility training;neuromuscular re-education;strengthening;transfer training;risk factor education;home program guidelines;progressive activity/exercise   Clinical Impression   Criteria for Skilled Therapeutic Intervention yes, treatment indicated   PT Diagnosis weakness   Influenced by the following impairments L sided weakness, poor balance, poor posture   Functional limitations due to impairments difficulty walking/transferring   Clinical Presentation Stable/Uncomplicated   Clinical Presentation Rationale clinical judgement   Clinical Decision Making (Complexity) Moderate complexity   Therapy Frequency Daily   Predicted Duration of Therapy Intervention (days/wks) 3 days   Anticipated Equipment Needs at Discharge   (platform walker)   Anticipated Discharge Disposition Transitional Care Facility   Risk & Benefits of therapy have been explained Yes   Patient, Family & other staff in agreement with plan of care Yes   [facility] CRE Secure TM \"6 Clicks\"   2016, Trustees of [facility], under license to [employer].  All rights reserved.   6 Clicks Short Forms Basic Mobility Inpatient Short Form   [facility] AM-PAC  \"6 Clicks\" V.2 Basic Mobility " Inpatient Short Form   1. Turning from your back to your side while in a flat bed without using bedrails? 3 - A Little   2. Moving from lying on your back to sitting on the side of a flat bed without using bedrails? 3 - A Little   3. Moving to and from a bed to a chair (including a wheelchair)? 1 - Total   4. Standing up from a chair using your arms (e.g., wheelchair, or bedside chair)? 1 - Total   5. To walk in hospital room? 1 - Total   6. Climbing 3-5 steps with a railing? 1 - Total   Basic Mobility Raw Score (Score out of 24.Lower scores equate to lower levels of function) 10   Total Evaluation Time   Total Evaluation Time (Minutes) 9

## 2019-09-30 ENCOUNTER — APPOINTMENT (OUTPATIENT)
Dept: SPEECH THERAPY | Facility: CLINIC | Age: 84
DRG: 065 | End: 2019-09-30
Payer: MEDICARE

## 2019-09-30 ENCOUNTER — APPOINTMENT (OUTPATIENT)
Dept: PHYSICAL THERAPY | Facility: CLINIC | Age: 84
DRG: 065 | End: 2019-09-30
Payer: MEDICARE

## 2019-09-30 LAB
LMWH PPP CHRO-ACNC: 0.18 IU/ML
LMWH PPP CHRO-ACNC: 0.36 IU/ML

## 2019-09-30 PROCEDURE — 25800030 ZZH RX IP 258 OP 636: Performed by: HOSPITALIST

## 2019-09-30 PROCEDURE — 12000047 ZZH R&B IMCU

## 2019-09-30 PROCEDURE — 99232 SBSQ HOSP IP/OBS MODERATE 35: CPT | Performed by: INTERNAL MEDICINE

## 2019-09-30 PROCEDURE — 97530 THERAPEUTIC ACTIVITIES: CPT | Mod: GP | Performed by: PHYSICAL THERAPIST

## 2019-09-30 PROCEDURE — 25000128 H RX IP 250 OP 636: Performed by: INTERNAL MEDICINE

## 2019-09-30 PROCEDURE — 36415 COLL VENOUS BLD VENIPUNCTURE: CPT | Performed by: INTERNAL MEDICINE

## 2019-09-30 PROCEDURE — 85520 HEPARIN ASSAY: CPT | Performed by: INTERNAL MEDICINE

## 2019-09-30 PROCEDURE — 97110 THERAPEUTIC EXERCISES: CPT | Mod: GP | Performed by: PHYSICAL THERAPIST

## 2019-09-30 PROCEDURE — 99232 SBSQ HOSP IP/OBS MODERATE 35: CPT | Performed by: PSYCHIATRY & NEUROLOGY

## 2019-09-30 PROCEDURE — 92526 ORAL FUNCTION THERAPY: CPT | Mod: GN | Performed by: SPEECH-LANGUAGE PATHOLOGIST

## 2019-09-30 RX ADMIN — HEPARIN SODIUM 350 UNITS/HR: 10000 INJECTION, SOLUTION INTRAVENOUS at 16:15

## 2019-09-30 RX ADMIN — POTASSIUM CHLORIDE, DEXTROSE MONOHYDRATE AND SODIUM CHLORIDE: 150; 5; 450 INJECTION, SOLUTION INTRAVENOUS at 00:54

## 2019-09-30 RX ADMIN — POTASSIUM CHLORIDE, DEXTROSE MONOHYDRATE AND SODIUM CHLORIDE: 150; 5; 450 INJECTION, SOLUTION INTRAVENOUS at 21:57

## 2019-09-30 ASSESSMENT — ACTIVITIES OF DAILY LIVING (ADL)
ADLS_ACUITY_SCORE: 34

## 2019-09-30 NOTE — CONSULTS
"BRIEF NUTRITION ASSESSMENT      REASON FOR ASSESSMENT:  Kylah Conroy is a 89 year old female seen by Registered Dietitian for Admission Nutrition Risk Screen for new/uncontrolled diabetes    NUTRITION HISTORY:  Chart reviewed  Pt resides at an [facility]  Meals provided  Per transfer sheets, pt may have a glass of wine in the evening as desired  Takes a daily multivitamin    CURRENT DIET AND INTAKE:  Diet:  NPO              9/29: SLP ---> Overall, pt presents with extremely weak throat clear and absent cough response. Due to delayed swallow initiation, fatigue and reduced oral-awareness, strongly suspect premature spillage resulting in silent aspiration. Recommend continuation of strict NPO at this time  9/30: SLP ---> Swallow function is slowly improving and would benefit from a video swallow study to fully assess pharyngeal function and aspiration risk. Family at bedside stated she would not want a feeding tube.     9/29: MD ---> Had long discussion with patient family.  They are adamant that the patient would not want tube feeds if needed and depending upon speech evaluation may consider going to hospice         ANTHROPOMETRICS:  Height: 4' 10\"  Weight:(9/29) 38 kg /  83 lbs 12.8 oz  Body mass index is 17.51 kg/m .   Weight Status: Underweight BMI <18.5  IBW:  43.2 kg  %IBW: 88%  Weight History:   Wt Readings from Last 10 Encounters:   09/29/19 38 kg (83 lb 12.8 oz)   03/30/18 42.4 kg (93 lb 7.6 oz)   05/07/16 43.2 kg (95 lb 4.8 oz)   03/02/16 41.6 kg (91 lb 11.4 oz)   02/26/16 45.4 kg (100 lb 1.4 oz)   08/26/13 40.8 kg (90 lb)         LABS:  9/28/19: HgbA1C 6.0   (no mention of DM in the chart)    MALNUTRITION:  Nutrition Focused Physical Assessment (NFPA) not appropriate at this time.    NUTRITION INTERVENTION:  Nutrition Diagnosis:  No nutrition diagnosis at this time.    Implementation:  Nutrition Education ---> No education needs identified   No intervention at this time, as pt remains NPO  Will follow plan of " care and diet recs (nutrition supplements if diet initiated)    FOLLOW UP/MONITORING:   Will re-evaluate in 2-3 days, or sooner, if re-consulted.

## 2019-09-30 NOTE — PLAN OF CARE
Discharge Planner PT   Patient plan for discharge: chart reports TCU  Current status: PT - pt sleepy but awakens easily and very cooperative/participatory in PT session. Transfers with mod assist 2, sat on EOB X 20 minutes with varying levels of assist, generally min to mod assist 1 for posterior and R lean. Unable to stand today, but martina exercises well and followed commands.  Barriers to return to prior living situation: pt requires assist 2 for mobility/lift  Recommendations for discharge: TCU  Rationale for recommendations: Pt will benefit from cont PT at TCU to address mobility and strength deficits to allow discharge  back to least restrictive living environment.       Entered by: Radha Finnegan 09/30/2019 12:18 PM

## 2019-09-30 NOTE — CONSULTS
Care Transition Initial Assessment - NORMAN     Met with: Patient and spoke with Nuvia mcmahan per phone.   Active Problems:    CVA (cerebral vascular accident) (H)       DATA  Lives With: facility resident   Living Arrangements: residential facility at [facility] in [facility].  Pt receives the highest level of care in the [place].     Description of Support System: Supportive, Involved  Who is your support system?: Other (specify)(niece and nephew)  Support Assessment: Adequate family and caregiver support.    Identified issues/concerns regarding health management: Pt is recommended for TCU placement.   Pt has an ACD and names  Nuvia as her agent.   Transportation Anticipated: [facility] stretcher transportation for discharge due to physical limitations.    ASSESSMENT  Cognitive Status:  Pt currently sleeping  Concerns to be addressed: TCU placement.  Referrals sent via [facility]     PLAN  Financial costs for the patient includes possible transportation  Patient given options and choices for discharge yes  Patient/family is agreeable to the plan?  Yes   Patient anticipates discharging to: TCU TBD.      ADDENDUM: NORMAN received call from Leatha at [facility] that they can accept pt Tuesday 10/1/19. NORMAN returned call and left voice message that family would accept the bed.  NORMAN updated abilamarBarbye and she is in agreement. She will be here at hospital for video study tomorrow am and SW to follow up with final discharge planning.      ESTRELLITA Tse  [facility] Care Transitions  Phone: [phone]

## 2019-09-30 NOTE — PLAN OF CARE
Discharge Planner SLP   Patient plan for discharge: Not addressed.   Current status: SLP: Patient seen for swallow treatment with family present. She was upright and fully alert. She was given trials of small single ice chips she demonstrated good mastication and reduced propulsion posterior, premature entry and delayed swallow with reduced laryngeal elevation. No coughing or throat clearing noted, but can not rule out penetration silent aspiration given weak cough for airway protection. Trials of nectar/honey consistency by the spoon she took 4 teaspoons and then had a weak cough/throat clear. 2 small 1/2 teaspoon amounts of pudding given with good bolus clearing and suspect pharyngeal retention that was cleared with a small ice chip, followed by a throat clear. Swallow function is slowly improving and would benefit from a video swallow study to fully assess pharyngeal function and aspiration risk. Family at bedside stated she would not want a feeding tube.     Recommend: 1. Continue NPO except for a few small ice chips with RN only for comfort and practice of a hard swallow. (1-5 per hour) hold if not tolerating. 2. Will complete a video swallow study on 10/01/19.     Barriers to return to prior living situation: Dysphagia and stroke deficits.   Recommendations for discharge: TCU  Rationale for recommendations: Patient will need on going ST needs for dysphagia management. Patient is well below her baseline.        Entered by: Rosa Frank 09/30/2019 10:34 AM

## 2019-09-30 NOTE — PROGRESS NOTES
[facility]    Medicine Progress Note - Hospitalist Service       Date of Admission:  9/28/2019  Assessment & Plan   Kylah Conroy is a 89 year old female with a history of atrial fibrillation but not on anticoagulation due to history of GI bleed who presented to the ED on 9/28/2019 with left sided weakness and facial droop.    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  Went to bed at 2000 yesterday feeling well but when awoke noted the deficits.  Given the time tPA was not administered.  Seen by stroke service in the ED.  CTA showed the occlusion of the right vertebral artery with thrombus in to the right subclavian and in to the patent distal right vertebral artery V4 segment.  Also noted to have plaques at carotid bifurcations and scattered atherosclerosis in the intracranial arteries.  CT head showed a possible small cortical infarct in the lateral right parietal lobe   MRI Brain showed recent ischemic infarcts in the left frontal lobe, right frontal lobe and left cerebellar hemisphere   Echocardiogram with bubble study is negative.    - Monitor telemetry  - Continue Q4h neurochecks  - Continue heparin drip   - PT/OT/Socal work consulted.  Currently recommending TCU   - Speech following.  Still recommending NPO but optimistic patient may improve. Plan for video swallow study tomorrow   - Neurology consulted and appreciate their recommendations.  Plan to transition to DOAC once tolerating PO     Atrial fibrillation  H/o GI bleed  Patient is not anticoagulated due to history of GI bleed.  In ED noted to be in atrial fibrillation but rate controlled  Has had some intermittent RVR while here but rates usually go down on their own   - Monitor on telemetry  - Anticoagulation as above   - PRN IV Diltiazem as patient is currently NPO     Goals of Care  Had long discussion with patient  family.  They are adamant that the patient would not want tube feeds if needed and depending upon speech evaluation may consider going to hospice.  Patient was in agreement with this   - Will discuss further as needed      Diet: NPO for Medical/Clinical Reasons Except for: No Exceptions    DVT Prophylaxis: Heparin drip  Donahue Catheter: not present  Code Status: DNR/DNI      Disposition Plan   Expected discharge: 1-2 days.  If speech allows her to have PO will send to TCU for further therapy.  If speech continues to recommend NPO will discuss further with patient and family regarding possible hospice as patient is addiment against a feeding tube   Entered: Dallas Rehman DO 09/30/2019, 1:13 PM       The patient's care was discussed with the Care Coordinator/, Patient and Patient's Family.    Dallas Rehman DO  Hospitalist Service  [facility]    ______________________________________________________________________    Interval History   Patient seen and examined.  No acute events over night.  Today her mood is improved.  No chest pain or SOB.  No fevers or chills.  Speech still recommending NPO.  Patient still does not want tube feeds    Data reviewed today: I reviewed all medications, new labs and imaging results over the last 24 hours. I personally reviewed no images or EKG's today.    Physical Exam   Vital Signs: Temp: 97.3  F (36.3  C) Temp src: Axillary BP: 130/73(during PT) Pulse: 87 Heart Rate: 85 Resp: 16 SpO2: 95 % O2 Device: None (Room air)    Weight: 83 lbs 12.8 oz  General Appearance: Resting comfortably.  NAD   Respiratory: Clear to auscultation.  No respiratory distress  Cardiovascular: RRR.  No obvious murmurs  GI: Bowel sounds present.  Non-tender  Skin: No rashes.  No cyanosis  Other: Left facial droop.  0/5 strength to left upper extremity      Data   Recent Labs   Lab 09/29/19  0820 09/28/19  0948   WBC  --  9.7   HGB  --  12.6   MCV  --  94   PLT  --  249   INR   --  1.07   NA  --  139   POTASSIUM  --  3.7   CHLORIDE  --  105   CO2  --  28   BUN  --  17   CR  --  0.68   ANIONGAP  --  6   ELO  --  9.0   GLC  --  132*   TROPI 0.054*  --      No results found for this or any previous visit (from the past 24 hour(s)).

## 2019-09-30 NOTE — PROVIDER NOTIFICATION
Called by nursing regarding NPO status and no IV fluids. Will start D5 1/2 NS with 20 mEq KCl at 50 ml/hr for now. Progress note from earlier today reviewed. Goals of care being discussed, defer to daytime rounder regarding continuing fluids.  Stepan Agrawal MD

## 2019-09-30 NOTE — PLAN OF CARE
Pt here with multiple acute infarcts. Pt is lethargic, but responds when spoken to. A&Ox2-3. Neuros: L droop, LUE & LLE weakness, and absent sensation on LUE/LLE. Legally blind, better on R side. VSS. Tele Afib CVR. Strict NPO continued. Up with lift or turn/repo q2hr. Incontinent of bladder and bowel, 1 small BM this shift. Denies pain. L PIV infusing heparin@4.5, recheck in AM. Pt scoring green on the Aggression Stop Light Tool. Plan for therapies to reassess tomorrow. Discussions started w/ family in regards to possible need for feeding tube and what pt's desires for POC would be. Discharge pending, likely TCU. Nursing will continue to monitor.

## 2019-09-30 NOTE — PLAN OF CARE
Pt here with multiple CVAs. A&Ox2-3, forgetful to time and situation. Becomes more lethargic in afternoon. Neuros: L droop, LUE 1/5, LLE 3/5, and absent sensation on LUE/LLE. VSS. Tele Afib CVR. NPO, except 1-5 ice chips w/ RN only. Incontinent of bladder. Up with lift, but not OOB, turn/repo q2hr. Denies pain. Pt scoring green on the Aggression Stop Light Tool. Plan for video swallow in AM. Discharge to TCU pending SLP evals and placement. Nursing will continue to monitor.

## 2019-09-30 NOTE — PROGRESS NOTES
"  [facility]    Stroke Progress Note    Interval Events  No acute events overnight. Failed swallow study today, plan for video swallow tomorrow. Remains on heparin drip. Patient expressed that she would not want to proceed with NG placement.     Stroke Evaluation summarized:  MRI/Head CT: MRI brain with recent ischemic infarcts in the left frontal lobe, right frontal  lobe and left cerebellar hemisphere.  Intracranial Vascular Imaging: CTA head with occlusion of most of the right vertebral artery with thrombus protruding into the right subclavian artery   Cervical Carotid and Vertebral Artery Vascular Imaging: CTA neck with mild plaque at both carotid bifurcations. No significant stenosis  Echocardiogram: severe atrial enlargement, negative bubble  EKG/Telemetry: Atrial fibrillation  LDL: 94  A1c: 6.0  Troponin: 0.054  Other testing: Not Applicable    Impression  Ischemic Stroke due to cardioembolism in the setting of known atrial fibrillation    Recommendations  - Q4 neuro checks  - Goal BP <140/90   - Continue Crestor 20 mg when able to take PO  - A1c within goal <7.0  - Continue heparin infusion for now, can consider DOAC when able to tolerate PO  - Telemetry  - Therapies    Patient Follow-Up  - final recommendation pending work-up    Thank you for this consult.  We will continue to follow.     SATINDER Salinas, [facility]  Neurology  09/30/2019 4:02 PM  Text Page (8am-5pm)  To page stroke neurology after hours or on a subsequent day, click here: AMCOM  Choose \"On Call\" tab at top, then search dropdown box for \"Neurology Adult\" & press Enter, look for Neuro ICU/Stroke  ______________________________________________________    Medications   Home Meds  Prior to Admission medications    Medication Sig Start Date End Date Taking? Authorizing Provider   acetaminophen (TYLENOL) 325 MG tablet Take 650 mg by mouth every 12 hours Takes at 0800 and 2000.   Yes Unknown, Entered By History   diphenhydrAMINE " (BENADRYL) 25 MG tablet Take 25 mg by mouth every 6 hours as needed for itching or allergies   Yes Unknown, Entered By History   furosemide (LASIX) 20 MG tablet Take 10 mg by mouth daily   Yes Unknown, Entered By History   multivitamin, therapeutic (THERA-VIT) TABS Take 1 tablet by mouth daily   Yes Unknown, Entered By History   pantoprazole (PROTONIX) 20 MG EC tablet Take 20 mg by mouth daily Takes at 1600    Yes Unknown, Entered By History   senna-docusate (SENOKOT-S/PERICOLACE) 8.6-50 MG tablet Take 1 tablet by mouth At Bedtime Must be 8 hours after a PRN dose.   Yes Unknown, Entered By History   senna-docusate (SENOKOT-S;PERICOLACE) 8.6-50 MG per tablet Take 1 tablet by mouth 2 times daily as needed for constipation   Yes Unknown, Entered By History       Scheduled Meds    rosuvastatin  20 mg Oral or NG Tube Daily     sodium chloride (PF)  3 mL Intracatheter Q8H       Infusion Meds    dextrose 5% and 0.45% NaCl + KCl 20 mEq/L 50 mL/hr at 09/30/19 0054     HEParin 350 Units/hr (09/30/19 1016)     - MEDICATION INSTRUCTIONS -       - MEDICATION INSTRUCTIONS -         PRN Meds  diltiazem, hydrALAZINE, labetalol, lidocaine 4%, lidocaine (buffered or not buffered), - MEDICATION INSTRUCTIONS -, melatonin, naloxone, - MEDICATION INSTRUCTIONS -, sodium chloride (PF)       PHYSICAL EXAMINATION  Temp:  [97.3  F (36.3  C)-98.1  F (36.7  C)] 98  F (36.7  C)  Pulse:  [87] 87  Heart Rate:  [81-95] 81  Resp:  [16] 16  BP: (121-143)/(67-83) 143/80  SpO2:  [93 %-97 %] 96 %     Neurologic  Mental Status:  Alert, follows commands, oriented to self but doesn't know month or age, can state birth date, speech remains slurred  Cranial Nerves:  legally blind, facial sensation intact, L facial droop, hearing intact to conversation  Motor:  normal muscle tone and bulk, no abnormal movements, LUE plegic, RUE/RLE with spontaneous movement  Sensory:  decreased sensation to L hemibody  Coordination:  no obvious ataxia  Station/Gait:  deferred      Imaging  I personally reviewed all imaging; relevant findings per HPI.     Lab Results Data   CBC  Recent Labs   Lab 09/28/19  0948   WBC 9.7   RBC 4.12   HGB 12.6   HCT 38.8        Basic Metabolic Panel    Recent Labs   Lab 09/28/19  0948      POTASSIUM 3.7   CHLORIDE 105   CO2 28   BUN 17   CR 0.68   *   ELO 9.0     Liver Panel  Recent Labs   Lab Test 03/27/18  0934 02/24/16  2052   PROTTOTAL 7.5 7.5   ALBUMIN 3.2* 3.6   BILITOTAL 0.6 0.8   ALKPHOS 135 119   AST 31 31   ALT 15 26     INR  Recent Labs   Lab Test 09/28/19  0948 03/27/18  0934 05/07/16  0927   INR 1.07 1.09 1.11      Lipid Profile  Recent Labs   Lab Test 09/28/19  1322   CHOL 161   HDL 57   LDL 94   TRIG 52     A1C  Recent Labs   Lab Test 09/28/19  1322 05/07/16  0927   A1C 6.0* 5.7     Troponin I  Recent Labs   Lab 09/29/19  0820   TROPI 0.054*

## 2019-09-30 NOTE — PLAN OF CARE
Pt A&Ox2 (d/o to time & situation). VSS on RA. NPO. Denies pain. Up Ax2 lift. Neuros - L side deficits: weakness, droop. Pt is legally blind. Heparin gtt @ 4.5, IVF running at 50ml/hr. Turn & repo q2h. Discharge TBD.

## 2019-10-01 ENCOUNTER — APPOINTMENT (OUTPATIENT)
Dept: GENERAL RADIOLOGY | Facility: CLINIC | Age: 84
DRG: 065 | End: 2019-10-01
Attending: INTERNAL MEDICINE
Payer: MEDICARE

## 2019-10-01 ENCOUNTER — APPOINTMENT (OUTPATIENT)
Dept: SPEECH THERAPY | Facility: CLINIC | Age: 84
DRG: 065 | End: 2019-10-01
Payer: MEDICARE

## 2019-10-01 LAB
ERYTHROCYTE [DISTWIDTH] IN BLOOD BY AUTOMATED COUNT: 13.8 % (ref 10–15)
HCT VFR BLD AUTO: 36.9 % (ref 35–47)
HGB BLD-MCNC: 11.8 G/DL (ref 11.7–15.7)
LMWH PPP CHRO-ACNC: <0.1 IU/ML
MCH RBC QN AUTO: 30.4 PG (ref 26.5–33)
MCHC RBC AUTO-ENTMCNC: 32 G/DL (ref 31.5–36.5)
MCV RBC AUTO: 95 FL (ref 78–100)
PLATELET # BLD AUTO: 226 10E9/L (ref 150–450)
RBC # BLD AUTO: 3.88 10E12/L (ref 3.8–5.2)
WBC # BLD AUTO: 5.2 10E9/L (ref 4–11)

## 2019-10-01 PROCEDURE — 92526 ORAL FUNCTION THERAPY: CPT | Mod: GN | Performed by: SPEECH-LANGUAGE PATHOLOGIST

## 2019-10-01 PROCEDURE — 12000000 ZZH R&B MED SURG/OB

## 2019-10-01 PROCEDURE — 74230 X-RAY XM SWLNG FUNCJ C+: CPT

## 2019-10-01 PROCEDURE — 85520 HEPARIN ASSAY: CPT | Performed by: INTERNAL MEDICINE

## 2019-10-01 PROCEDURE — 36415 COLL VENOUS BLD VENIPUNCTURE: CPT | Performed by: INTERNAL MEDICINE

## 2019-10-01 PROCEDURE — 85027 COMPLETE CBC AUTOMATED: CPT | Performed by: INTERNAL MEDICINE

## 2019-10-01 PROCEDURE — 99231 SBSQ HOSP IP/OBS SF/LOW 25: CPT | Performed by: INTERNAL MEDICINE

## 2019-10-01 PROCEDURE — 92611 MOTION FLUOROSCOPY/SWALLOW: CPT | Mod: GN | Performed by: SPEECH-LANGUAGE PATHOLOGIST

## 2019-10-01 PROCEDURE — 25000128 H RX IP 250 OP 636: Performed by: INTERNAL MEDICINE

## 2019-10-01 RX ORDER — ONDANSETRON 2 MG/ML
4 INJECTION INTRAMUSCULAR; INTRAVENOUS EVERY 6 HOURS PRN
Status: DISCONTINUED | OUTPATIENT
Start: 2019-10-01 | End: 2019-10-02 | Stop reason: HOSPADM

## 2019-10-01 RX ORDER — BARIUM SULFATE 400 MG/ML
SUSPENSION ORAL ONCE
Status: COMPLETED | OUTPATIENT
Start: 2019-10-01 | End: 2019-10-01

## 2019-10-01 RX ORDER — MORPHINE SULFATE 100 MG/5ML
5-10 SOLUTION ORAL
Status: DISCONTINUED | OUTPATIENT
Start: 2019-10-01 | End: 2019-10-02 | Stop reason: HOSPADM

## 2019-10-01 RX ORDER — MORPHINE SULFATE 10 MG/5ML
5-10 SOLUTION ORAL
Status: DISCONTINUED | OUTPATIENT
Start: 2019-10-01 | End: 2019-10-02 | Stop reason: HOSPADM

## 2019-10-01 RX ORDER — BISACODYL 10 MG
10 SUPPOSITORY, RECTAL RECTAL
Status: DISCONTINUED | OUTPATIENT
Start: 2019-10-04 | End: 2019-10-02 | Stop reason: HOSPADM

## 2019-10-01 RX ORDER — ACETAMINOPHEN 325 MG/1
650 TABLET ORAL EVERY 6 HOURS PRN
Status: DISCONTINUED | OUTPATIENT
Start: 2019-10-01 | End: 2019-10-02 | Stop reason: HOSPADM

## 2019-10-01 RX ORDER — ONDANSETRON 4 MG/1
4 TABLET, ORALLY DISINTEGRATING ORAL EVERY 6 HOURS PRN
Status: DISCONTINUED | OUTPATIENT
Start: 2019-10-01 | End: 2019-10-02 | Stop reason: HOSPADM

## 2019-10-01 RX ORDER — ACETAMINOPHEN 650 MG/1
650 SUPPOSITORY RECTAL EVERY 6 HOURS PRN
Status: DISCONTINUED | OUTPATIENT
Start: 2019-10-01 | End: 2019-10-02 | Stop reason: HOSPADM

## 2019-10-01 RX ORDER — ATROPINE SULFATE 10 MG/ML
1-2 SOLUTION/ DROPS OPHTHALMIC
Status: DISCONTINUED | OUTPATIENT
Start: 2019-10-01 | End: 2019-10-02 | Stop reason: HOSPADM

## 2019-10-01 RX ORDER — AMOXICILLIN 250 MG
1 CAPSULE ORAL 2 TIMES DAILY
Status: DISCONTINUED | OUTPATIENT
Start: 2019-10-01 | End: 2019-10-02 | Stop reason: HOSPADM

## 2019-10-01 RX ADMIN — Medication 1900 UNITS: at 09:32

## 2019-10-01 RX ADMIN — HEPARIN SODIUM 500 UNITS/HR: 10000 INJECTION, SOLUTION INTRAVENOUS at 09:33

## 2019-10-01 RX ADMIN — BARIUM SULFATE 20 ML: 400 SUSPENSION ORAL at 10:16

## 2019-10-01 ASSESSMENT — ACTIVITIES OF DAILY LIVING (ADL)
ADLS_ACUITY_SCORE: 38
ADLS_ACUITY_SCORE: 34

## 2019-10-01 NOTE — PLAN OF CARE
[id number]  name and number provided for further follow up. Time Spent This Encounter Total: 5 min medical record review, telephone,  communication.      Monthly Minute Total including today: 5 Pt here with multiple acute CVAs. A&Ox2-3. Comfort cares initiated this afternoon. Full nectar thick liquid diet. Takes pills crushed. Incontinent of bladder. Bedrest, turn/repo. Denies pain. Pt scoring green on the Aggression Stop Light Tool. Plan for hospice enrollment tomorrow. Discharge hospice care center likely tomorrow after enrollment. Nursing will continue to monitor.

## 2019-10-01 NOTE — PROGRESS NOTES
[facility]    Medicine Progress Note - Hospitalist Service       Date of Admission:  9/28/2019  Assessment & Plan   Kylah Conroy is a 89 year old female with a history of atrial fibrillation but not on anticoagulation due to history of GI bleed who presented to the ED on 9/28/2019 with left sided weakness and facial droop.    End of life  On 10/1 patient and family decided to move to comfort cares and enroll in hospice at discharge.    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  Went to bed at 2000 yesterday feeling well but when awoke noted the deficits.  Given the time tPA was not administered.  Seen by stroke service in the ED.  CTA showed the occlusion of the right vertebral artery with thrombus in to the right subclavian and in to the patent distal right vertebral artery V4 segment.  Also noted to have plaques at carotid bifurcations and scattered atherosclerosis in the intracranial arteries.  CT head showed a possible small cortical infarct in the lateral right parietal lobe   MRI Brain showed recent ischemic infarcts in the left frontal lobe, right frontal lobe and left cerebellar hemisphere   Echocardiogram with bubble study is negative.    - Monitor telemetry  - Stopped heparin drip   - PT/OT/Socal work consulted.  Currently recommending TCU   - Speech following.  Still recommending NPO but optimistic patient may improve. Plan for video swallow study tomorrow   - Neurology consulted and appreciate their recommendations.  Plan to transition to DOAC once tolerating PO     Atrial fibrillation  H/o GI bleed  Patient is not anticoagulated due to history of GI bleed.  In ED noted to be in atrial fibrillation but rate  controlled  Has had some intermittent RVR while here but rates usually go down on their own   - Monitor on telemetry      Diet: Regular Diet Adult    DVT Prophylaxis: Comfort cares   Donahue Catheter: not present  Code Status: DNR/DNI      Disposition Plan   Expected discharge: Hopefully discharge back to home tomorrow with home hospice set up.  Entered: Dallas Rehman DO 10/01/2019, 10:53 AM       The patient's care was discussed with the Care Coordinator/, Patient and Patient's Family.    Dallas Rehman DO  Hospitalist Service  [facility]    ______________________________________________________________________    Interval History   Resting comfortably . Patient states she is ready to die and would like to enroll in hospice as she does not feel she would improve with TCU.  She is of sound mind and family is in agreement with this plan.     Data reviewed today: I reviewed all medications, new labs and imaging results over the last 24 hours. I personally reviewed no images or EKG's today.    Physical Exam   Vital Signs: Temp: 98  F (36.7  C) Temp src: Oral BP: 117/70 Pulse: 70 Heart Rate: 77 Resp: 20 SpO2: 92 % O2 Device: None (Room air)    Weight: 83 lbs 12.8 oz  General Appearance: Resting comfortably.  NAD   Respiratory: Did not examine as moving to comfort cares   Cardiovascular: Did not examine as moving to comfort cares  GI: Did not examine as moving to comfort cares  Skin: Did not examine as moving to comfort cares    Data   Recent Labs   Lab 10/01/19  0713 09/29/19  0820 09/28/19  0948   WBC 5.2  --  9.7   HGB 11.8  --  12.6   MCV 95  --  94     --  249   INR  --   --  1.07   NA  --   --  139   POTASSIUM  --   --  3.7   CHLORIDE  --   --  105   CO2  --   --  28   BUN  --   --  17   CR  --   --  0.68   ANIONGAP  --   --  6   ELO  --   --  9.0   GLC  --   --  132*   TROPI  --  0.054*  --      Recent Results (from the past 24 hour(s))   XR Video Swallow w/o Esophagram     Narrative    VIDEO SWALLOWING EVALUATION  10/1/2019 10:27 AM     HISTORY: Strokes, patient is blind and not taking food orally.    COMPARISON: None.    FLUOROSCOPY TIME: 1.8 minutes.  SPOT IMAGES OR CINE RUNS: 9    FINDINGS:    Thin: Deep penetration of the vestibule. Improved with chin tuck  maneuver.    Nectar: Normal swallows. Minimal silent aspiration between swallows  one time.    Honey: Not administered.    Pudding: Normal.    Semisolid: Not administered.    Solid: Not administered.    This study only includes the cervical esophagus.    VENKAT GOMEZ MD

## 2019-10-01 NOTE — PLAN OF CARE
Discharge Planner SLP   Patient plan for discharge: Not addressed.  Current status: Video swallow study completed. Patient presents with moderate to servere oral and pharyngeal dysphagia secondary to left CVA. Deficits include; reduced bolus control, reduced anterior to posterior propulsion of the bolus, premature entry and delayed swallow response. These dysfunctions resulted in premature spillage of thin liquids by spoon and cup with 2 episode of deep laryngeal penetration to the cords with a weak cough response. A chin tuck maneuver eleminated the penetration. No penetration via the spoon with nectar thick liquids and questionable between swallows via the cup. Pudding required 2 swallows to clear the oral cavity with mild BOT and vallecular residue.     Recommend:1. Full liquid diet with nectar thick liquids by spoon. 2. Upright, assistance with feeding, liquids by spoon with a chin tuck, double swallow and alternate liquids/solids. Hold diet if respiratory status changes.     Barriers to return to prior living situation: Acute stroke deficits.   Recommendations for discharge: TCU  Rationale for recommendations: Patient will need on going ST needs for dysphagia management and diet tolerance and advancement. Patient is motivated and below her baseline.        Entered by: Rosa Frank 10/01/2019 12:54 PM

## 2019-10-01 NOTE — PLAN OF CARE
Pt here with multiple strokes on comfort cares. A&O to person, place and situation. Full liquid nectar thick diet, nectar thick liquids. Takes pills crushed in pudding. Up with A2 + lift. Denies pain. Pt scoring yellow on the Aggression Stop Light Tool for disorientation. Plan to discharge to Hospice at [facility] tomorrow at 0945 by kiersten.

## 2019-10-01 NOTE — PROGRESS NOTES
SPIRITUAL HEALTH SERVICES Progress Note  -42    Pt was not available so  talked with pt's niece. The niece mentioned that [facility] [place] [facility] and families wish she could come back to [place] but she never did. Pt doesn't have any children and the niece was pretty clear that pt wouldn't want to see a . No need to follow up at this point.      Shana Vee  Chaplain Resident

## 2019-10-01 NOTE — PROGRESS NOTES
"   10/01/19 1118   General Information   Onset Date 09/29/19   Start of Care Date 10/01/19   Patient Profile Review/OT: Additional Occupational Profile Info See Profile for full history and prior level of function   Swallowing Evaluation Videofluoroscopic evaluation   Behaviorial Observations Alert   Mode of current nutrition NPO   Respiratory Status O2 Supply   Type of O2 supply Nasal cannula   Comments Per MD: \"Kylah Conroy is a 89 year old female who was brought to the [facility] ED for evaluation of left sided weakness. She resides at an assisted living facility where staff reports she was last known well at 8 pm last night. When staff assisted her in getting out of bed this morning at 8:30, they noted she was unable to transfer into her wheelchair and appeared to be leaning to the left side. She was also found to have a left facial droop and slurred speech at this time. The patient was asked when she noticed these deficits and said that she didn't know. Staff at the facility report that she is wheelchair bound and legally blind and requires assistance with all ADLs. \"   VFSS Eval: Radiology   Radiologist Dr. Mark   Views Taken left lateral   Physical Location of Procedure [facility]   VFSS Eval: Thin Liquid Texture Trial   Mode of Presentation, Thin Liquid cup;spoon;self-fed;fed by clinician   Preparatory Phase Poor bolus control   Oral Phase, Thin Liquid Poor AP movement;Residue in oral cavity;Premature pharyngeal entry   Pharyngeal Phase, Thin Liquid Delayed swallow reflex   Rosenbek's Penetration Aspiration Scale: Thin Liquid Trial Results 5 - contrast contacts vocal cords, visible residue remains (penetration)   Response to Aspiration unproductive reflexive involuntary cough/throat clear   Successful Strategies Trialed During Procedure, Thin Liquid chin tuck  (eliminated penetration.)   Diagnostic Statement Deep laryngeal penetration to the cords with a cough x2.    VFSS Eval: Nectar Thick Liquid Texture " Trial   Mode of Presentation, Nectar cup;spoon;self-fed;fed by clinician   Preparatory Phase Poor bolus control   Oral Phase, Nectar Poor AP movement;Premature pharyngeal entry   Pharyngeal Phase, Nectar Delayed swallow reflex   Rosenbek's Penetration Aspiration Scale: Nectar-Thick Liquid Trial Results 1 - no aspiration, contrast does not enter airway   Successful Strategies Trialed During Procedure, Nectar chin tuck   VFSS Eval: Puree Solid Texture Trial   Mode of Presentation, Puree spoon;fed by clinician   Preparatory Phase WFL   Oral Phase, Puree Poor AP movement;Residue in oral cavity;Premature pharyngeal entry   Pharyngeal Phase, Puree Delayed swallow reflex;Residue in valleculae   Rosenbek's Penetration Aspiration Scale: Puree Food Trial Results 1 - no aspiration, contrast does not enter airway   Diagnostic Statement 2 swallows to clear from oral cavity with mild vallecular residue.    General Therapy Interventions   Planned Therapy Interventions Dysphagia Treatment   Dysphagia treatment Modified diet education;Instruction of safe swallow strategies   Swallow Eval: Clinical Impressions   Skilled Criteria for Therapy Intervention Skilled criteria met.  Treatment indicated.   Functional Assessment Scale (FAS) 2   Dysphagia Outcome Severity Scale (SARA) Level 2 - SARA   Diet texture recommendations Full liquid;Nectar thick liquids   Recommended Feeding/Eating Techniques alternate between small bites and sips of food/liquid;check mouth frequently for oral residue/pocketing;hard swallow w/ each bite or sip;maintain upright posture during/after eating for 30 mins;no straws;small sips/bites;tuck chin during every swallow   Therapy Frequency Daily   Predicted Duration of Therapy Intervention (days/wks) 1 week   Anticipated Discharge Disposition inpatient rehabilitation facility   Risks and Benefits of Treatment have been explained. Yes   Patient, family and/or staff in agreement with Plan of Care Yes   Clinical  Impression Comments Patient presents with moderate to servere oral and pharyngeal dysphagia secondary to left CVA. Deficits include; reduced bolus control, reduced anterior to posterior propulsion of the bolus, premature entry and delayed swallow response. These dysfunctions resulted in premature spillage of thin liquids by spoon and cup with 2 episode of deep laryngeal penetration to the cords with a weak cough response. A chin tuck maneuver eleminated the penetration. No penetration via the spoon with nectar thick liquids and questionable between swallows via the cup. Pudding required 2 swallows to clear the oral cavity with mild BOT and vallecular residue. Recommend:1. Full liquid diet with nectar thick liquids by spoon. 2. Upright, assistance with feeding, liquids by spoon with a chin tuck, double swallow and alternate liquids/solids.     Total Evaluation Time   Total Evaluation Time (Minutes) 20

## 2019-10-01 NOTE — PLAN OF CARE
Alert this evening, oriented to self and place, converses and able to make needs known. VSS. Denies pain. Neuros with decreased sensation on the left, left facial droop, left hemiparesis (LUE worse than LLE). Pt is legally blind. Bedrest, incontinent of urine. Turn/repo q2h. Tele afib with cvr. Hep gtt infusing, continue at current rate and redraw Hep 10a tomorrow morning. NPO, tolerates and occasional ice chip well. Plan pending speech recommendations, possibly hospice if continues to be unable to take PO as patient does not want a feeding tube. Family present this evening, involved and supportive.

## 2019-10-01 NOTE — PROGRESS NOTES
"  [facility]    Stroke Progress Note    Interval Events  No acute events overnight. Patient and family have decided to pursue comfort cares and patient will be discharged to hospice tomorrow.     Impression  Ischemic Stroke due to cardioembolism in the setting of known atrial fibrillation    Recommendations  - Discharge with comfort cares per family's wishes, no further interventions from neuro perspective    Patient Follow-Up  - None    Thank you for this consult.  No further stroke evaluation is recommended, so we will sign off. Please contact us with any additional questions.    SATINDER Salinas, [facility]  Neurology  10/01/2019 9:09 AM  Text Page (8am-5pm)  To page stroke neurology after hours or on a subsequent day, click here: AMCOM  Choose \"On Call\" tab at top, then search dropdown box for \"Neurology Adult\" & press Enter, look for Neuro ICU/Stroke  ______________________________________________________    Medications   Home Meds  Prior to Admission medications    Medication Sig Start Date End Date Taking? Authorizing Provider   acetaminophen (TYLENOL) 325 MG tablet Take 650 mg by mouth every 12 hours Takes at 0800 and 2000.   Yes Unknown, Entered By History   diphenhydrAMINE (BENADRYL) 25 MG tablet Take 25 mg by mouth every 6 hours as needed for itching or allergies   Yes Unknown, Entered By History   furosemide (LASIX) 20 MG tablet Take 10 mg by mouth daily   Yes Unknown, Entered By History   multivitamin, therapeutic (THERA-VIT) TABS Take 1 tablet by mouth daily   Yes Unknown, Entered By History   pantoprazole (PROTONIX) 20 MG EC tablet Take 20 mg by mouth daily Takes at 1600    Yes Unknown, Entered By History   senna-docusate (SENOKOT-S/PERICOLACE) 8.6-50 MG tablet Take 1 tablet by mouth At Bedtime Must be 8 hours after a PRN dose.   Yes Unknown, Entered By History   senna-docusate (SENOKOT-S;PERICOLACE) 8.6-50 MG per tablet Take 1 tablet by mouth 2 times daily as needed for constipation   " Yes Unknown, Entered By History       Scheduled Meds    heparin Loading Dose  1,900 Units Intravenous Once     rosuvastatin  20 mg Oral or NG Tube Daily     sodium chloride (PF)  3 mL Intracatheter Q8H       Infusion Meds    dextrose 5% and 0.45% NaCl + KCl 20 mEq/L 50 mL/hr at 09/30/19 2157     HEParin       - MEDICATION INSTRUCTIONS -       - MEDICATION INSTRUCTIONS -         PRN Meds  diltiazem, hydrALAZINE, labetalol, lidocaine 4%, lidocaine (buffered or not buffered), - MEDICATION INSTRUCTIONS -, melatonin, naloxone, - MEDICATION INSTRUCTIONS -, sodium chloride (PF)       PHYSICAL EXAMINATION  Temp:  [97.2  F (36.2  C)-98  F (36.7  C)] 98  F (36.7  C)  Pulse:  [70] 70  Heart Rate:  [77-90] 77  Resp:  [16-20] 20  BP: (114-143)/(64-80) 117/70  SpO2:  [92 %-96 %] 92 %     Neurologic  Mental Status:  Alert to voice, pleasantly confused  Cranial Nerves:  L facial droop  Motor:  LUE plegic  Sensory:  decreased sensation to L hemibody  Coordination:  no obvious ataxia  Station/Gait:  deferred     Imaging  I personally reviewed all imaging; relevant findings per HPI.     Lab Results Data   CBC  Recent Labs   Lab 10/01/19  0713 09/28/19  0948   WBC 5.2 9.7   RBC 3.88 4.12   HGB 11.8 12.6   HCT 36.9 38.8    249     Basic Metabolic Panel    Recent Labs   Lab 09/28/19  0948      POTASSIUM 3.7   CHLORIDE 105   CO2 28   BUN 17   CR 0.68   *   ELO 9.0     Liver Panel  Recent Labs   Lab Test 03/27/18  0934 02/24/16  2052   PROTTOTAL 7.5 7.5   ALBUMIN 3.2* 3.6   BILITOTAL 0.6 0.8   ALKPHOS 135 119   AST 31 31   ALT 15 26     INR  Recent Labs   Lab Test 09/28/19  0948 03/27/18  0934 05/07/16  0927   INR 1.07 1.09 1.11      Lipid Profile  Recent Labs   Lab Test 09/28/19  1322   CHOL 161   HDL 57   LDL 94   TRIG 52     A1C  Recent Labs   Lab Test 09/28/19  1322 05/07/16  0927   A1C 6.0* 5.7     Troponin I  Recent Labs   Lab 09/29/19  0820   TROPI 0.054*

## 2019-10-01 NOTE — PROGRESS NOTES
CM    I:  NORMAN received an update that patient has chosen [facility]/Hospice, with hopes to return to her apartment at [facility]. NORMAN spoke w/ at [facility] (Mariella  ext [phone]) who confirms pt can return and they can provide care up to end of life. Mariella stated they prefer [facility], if family is willing, and they prefer pt be enrolled prior to discharge. NORMAN spoke w/martir, who agrees with [facility]. NORMAN will attempt to set up a meeting time asap. Per Mariella, pt would need to return to [facility] by 1pm on any day. Anticipate discharge: 10/2. NORMAN updated [facility] who had accepted patient into their TCU.     P: Continue to assist as needed.    ESTRELLITA Mei    UPDATE@0263: NORMAN spoke w/[facility] () who plans to set up for an intake thru their [facility] team today/vs tomorrow morning. Walter will update NORMAN w/time when confirmed.      UPDATE@1240: NORMAN faxed requested information to [facility] (Fax )    UPDATE@1510: NORMAN placed call to [facility] who reports they have confirmed with [facility] that patient will enroll onto Hospice once she returns there; [facility] reports they are set to arrive at [facility] @ 1:30 PM-per Diamond/RN. Mariella at [facility] has confirmed this plan, as has patient's niece, Nuvia. NORMAN set up [facility] stretcher for: 945 AM. PCS form complete, faxed to HE and placed in front of patient's chart. NORMAN updated physician asking that orders be ready early tomorrow AM. NORMAN updated Charge RN and niece of discharge plan.  NORMAN consult for Hospice has been closed.

## 2019-10-01 NOTE — PLAN OF CARE
Pt A&Ox2. Neuros: L side deficits weakness, droop, absent sensation. VSS on RA. Tele A fib CVR. Total care Turn & Repo q2h. IVF 50ml/hr + heparin drip 4.5ml/hr. Up Ax2 with lift. NPO. Video swallow later today will determine next steps, if pt passes study she will go to TCU to attempt rehab, if pt fails test the next step is hospice. Denies pain. Incontinent of bladder. Aggression = green.

## 2019-10-01 NOTE — PLAN OF CARE
PT-Noted patient transitioning to hospice and spoke with care coordinator. No further PT needed. Physical Therapy Discharge Summary    Reason for therapy discharge:    Patient transitioning to comfortcare/hospice.     Progress towards therapy goal(s). See goals on Care Plan in Epic electronic health record for goal details.  Goals not met.  Barriers to achieving goals:   Transitioning to comfort care/hospice.    Therapy recommendation(s):    No further therapy is recommended.

## 2019-10-01 NOTE — CONSULTS
Azucena class initiated with patient and niece. After a brief overview of the class content, niece stated patient was not interested in class. Niece stated patient has decided to go into hospice care and stroke class seemed inappropriate for her. Patient agreed. Class was discontinued at this time.

## 2019-10-02 VITALS
WEIGHT: 83.8 LBS | TEMPERATURE: 97.2 F | RESPIRATION RATE: 16 BRPM | HEIGHT: 58 IN | BODY MASS INDEX: 17.59 KG/M2 | OXYGEN SATURATION: 94 % | SYSTOLIC BLOOD PRESSURE: 115 MMHG | DIASTOLIC BLOOD PRESSURE: 63 MMHG | HEART RATE: 82 BPM

## 2019-10-02 PROCEDURE — 99231 SBSQ HOSP IP/OBS SF/LOW 25: CPT | Performed by: PSYCHIATRY & NEUROLOGY

## 2019-10-02 PROCEDURE — 99238 HOSP IP/OBS DSCHRG MGMT 30/<: CPT | Performed by: INTERNAL MEDICINE

## 2019-10-02 RX ORDER — ONDANSETRON 4 MG/1
4 TABLET, ORALLY DISINTEGRATING ORAL EVERY 6 HOURS PRN
Qty: 10 TABLET | Refills: 0 | Status: SHIPPED | OUTPATIENT
Start: 2019-10-02

## 2019-10-02 RX ORDER — ACETAMINOPHEN 650 MG/1
650 SUPPOSITORY RECTAL EVERY 6 HOURS PRN
Qty: 10 SUPPOSITORY | Refills: 0 | Status: SHIPPED | OUTPATIENT
Start: 2019-10-02

## 2019-10-02 RX ORDER — ACETAMINOPHEN 325 MG/1
650 TABLET ORAL EVERY 6 HOURS PRN
Start: 2019-10-02

## 2019-10-02 RX ORDER — MORPHINE SULFATE 100 MG/5ML
5 SOLUTION ORAL
Qty: 15 ML | Refills: 0 | Status: SHIPPED | OUTPATIENT
Start: 2019-10-02

## 2019-10-02 RX ORDER — ATROPINE SULFATE 10 MG/ML
1-2 SOLUTION/ DROPS OPHTHALMIC
Qty: 1 BOTTLE | Refills: 0 | Status: SHIPPED | OUTPATIENT
Start: 2019-10-02

## 2019-10-02 RX ORDER — LORAZEPAM 2 MG/ML
.25-.5 CONCENTRATE ORAL EVERY 4 HOURS PRN
Qty: 30 ML | Refills: 0 | Status: SHIPPED | OUTPATIENT
Start: 2019-10-02

## 2019-10-02 RX ORDER — BISACODYL 10 MG
10 SUPPOSITORY, RECTAL RECTAL
Qty: 5 SUPPOSITORY | Refills: 0 | Status: SHIPPED | OUTPATIENT
Start: 2019-10-04

## 2019-10-02 ASSESSMENT — ACTIVITIES OF DAILY LIVING (ADL)
ADLS_ACUITY_SCORE: 38

## 2019-10-02 NOTE — DISCHARGE SUMMARY
[facility]  Hospitalist Discharge Summary       Date of Admission:  9/28/2019  Date of Discharge:  10/2/2019 10:01 AM  Discharging Provider: Dallas Rehman DO      Discharge Diagnoses   1. 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        Discharge Disposition   Discharged to long-term care facility with outpatient hospice   Condition at discharge: Stable    Hospital Course   End of life  On 10/1 patient and family decided to move to comfort cares and enroll in hospice at discharge.    - Comfort cares ordered  - Regular diet as patient is accepting of the risk of aspiration and would like to eat      Consultations This Hospital Stay   NEUROLOGY IP CONSULT  PHYSICAL THERAPY ADULT IP CONSULT  OCCUPATIONAL THERAPY ADULT IP CONSULT  SPEECH LANGUAGE PATH ADULT IP CONSULT  SWALLOW EVAL SPEECH PATH AT BEDSIDE IP CONSULT  SMOKING CESSATION PROGRAM IP CONSULT  PATIENT LEARNING CENTER IP CONSULT  PHARMACY TO DOSE HEPARIN  SOCIAL WORK IP CONSULT    Code Status   DNR/DNI    Time Spent on this Encounter   I, Dallas Rehman DO, personally saw the patient today and spent less than or equal to 30 minutes discharging this patient.       Dallas Rehman DO  [facility]  ______________________________________________________________________    Physical Exam   Vital Signs: Temp: 97.2  F (36.2  C) Temp src: Oral BP: 115/63 Pulse: 82 Heart Rate: 77 Resp: 16 SpO2: 94 % O2 Device: None (Room air)    Weight: 83 lbs 12.8 oz  General Appearance: Sleeping but appears  comfortable  Respiratory: Did not examine as comfort cares  Cardiovascular: Did not examine as comfort cares  GI: Did not examine as comfort cares  Skin: Did not examine as comfort cares       Primary Care Physician   Toya Ley MD    Discharge Orders      Reason for your hospital stay    Stroke and then transition to end of life cares     Follow-up and recommended labs and tests     Follow up with [facility] as planned     Activity    Your activity upon discharge: activity as tolerated     Diet    Follow this diet upon discharge: Orders Placed This Encounter    Regular diet       Significant Results and Procedures   Most Recent 3 CBC's:  Recent Labs   Lab Test 10/01/19  0713 09/28/19  0948 03/30/18  0902 03/29/18  0752   WBC 5.2 9.7  --  6.0   HGB 11.8 12.6  --  10.3*   MCV 95 94  --  93    249 194 150     Most Recent 3 BMP's:  Recent Labs   Lab Test 09/28/19  0948 03/30/18  0902 03/29/18  0752 03/28/18  0851     --  140 138   POTASSIUM 3.7 3.8 3.8 3.8   CHLORIDE 105  --  109 105   CO2 28  --  25 26   BUN 17  --  10 15   CR 0.68  --  0.66 0.78   ANIONGAP 6  --  6 7   ELO 9.0  --  7.8* 7.9*   *  --  91 95   ,   Results for orders placed or performed during the hospital encounter of 09/28/19   CT Head w/o Contrast    Narrative    CT SCAN OF THE HEAD WITHOUT CONTRAST 9/28/2019 10:05 AM     HISTORY: Code stroke with left-sided weakness. History of transient  ischemic attacks.    TECHNIQUE: Axial images of the head and coronal reformations without  IV contrast material. Radiation dose for this scan was reduced using  automated exposure control, adjustment of the mA and/or kV according  to patient size, or iterative reconstruction technique.    COMPARISON: 3/7/2011 MRI    FINDINGS: There is generalized atrophy of the brain. There is low  attenuation in the white matter of the cerebral hemispheres consistent  with sequelae of small vessel ischemic disease. There is  encephalomalacia in the  posteromedial left occipital lobe, new since  2011. There is no evidence of intracranial hemorrhage, mass, acute  infarct or anomaly.     The visualized portions of the sinuses and mastoids appear normal.  There is no evidence of trauma.       Impression    IMPRESSION:   1. No acute abnormality.  2. Atrophy of the brain. White matter changes consistent with sequelae  of small vessel ischemic disease. This is unchanged.  3. Old infarct in the posteromedial left occipital lobe, new since  2011.    VENKAT GOMEZ MD   CTA Head Neck with Contrast    Narrative    CT ANGIOGRAM OF THE HEAD AND NECK WITHOUT AND WITH CONTRAST  9/28/2019  10:05 AM     HISTORY: Code stroke, left-sided weakness and history of transient  ischemic attacks. Weakness and no  strength in the left arm,  slurred speech and left facial droop. Normal strength in the legs.  History of atrial fibrillation.    TECHNIQUE:  Precontrast localizing scans were followed by CT  angiography with an injection of 70 mL Isovue-370 IV with scans  through the head and neck.  3D post processing was performed, images  were archived to PACS and used in interpretation of this study.   Estimates of carotid stenoses are made relative to the distal internal  carotid artery diameters except as noted. Radiation dose for this scan  was reduced using automated exposure control, adjustment of the mA  and/or kV according to patient size, or iterative reconstruction  technique.    COMPARISON: None.    CT HEAD FINDINGS:  No contrast enhancing lesions.   Cerebral blood  flow is grossly normal.    CT ANGIOGRAM HEAD FINDINGS: Calcified atherosclerotic plaque is seen  in the carotid siphons causing at most mild stenosis. Right vertebral  artery is occluded throughout its length from the origin up to the V4  segment, with thrombus noted in the V4 segment proximally and patency  of the distal right V4 segment and basilar artery. Multiple  atherosclerotic stenoses are seen in the  intracranial arteries  distally especially in the left posterior cerebral artery. No  aneurysm. Venous circulation is poorly enhanced.     CT ANGIOGRAM NECK FINDINGS:   Right carotid artery: Mild calcified plaque in the proximal internal  and external carotid arteries. Tortuous distal cervical internal  carotid. No significant stenosis.      Left carotid artery: Mild calcified plaque in the distal common  carotid and proximal internal carotid arteries. Tortuous distal  cervical internal carotid. No significant stenosis.      Vertebral arteries: Occluded right vertebral artery from the origin to  the V4 segment, with thrombus protruding into the right subclavian  artery and into the right V4 segment suggesting that this is acute.  Left vertebral artery arises from the aortic arch and is nondominant,  tortuous and widely patent.      Other findings: None.      Impression    IMPRESSION:   1. Occlusion of most of the right vertebral artery with thrombus  protruding into the right subclavian artery and into the patent distal  right vertebral artery V4 segment. This suggests that the occlusion is  acute.  2. Calcified atherosclerotic plaque in the carotid bifurcations and  siphons with tortuous distal cervical internal carotid arteries.  3. Scattered atherosclerotic stenoses in the intracranial arteries,  worse than the left posterior cerebral artery distally.    I called the report to Dr. Noe Gee in the emergency room on  9/28/2019 at 10:13 AM.    VENKAT GOMEZ MD   CT Head Perfusion w Contrast    Narrative    CT BRAIN PERFUSION 9/28/2019 10:16 AM    HISTORY: Code stroke, left-sided weakness and left facial droop.  History of previous TIAs and strokes.    TECHNIQUE: Time sequential axial CT images of the head were acquired  during the administration of 70 mL Isovue-370 IV. For the CTA images  of the [place] as well as color perfusion maps of the brain  were created from this time sequential axial  source data. Radiation  dose for this scan was reduced using automated exposure control,  adjustment of the mA and/or kV according to patient size, or iterative  reconstruction technique.    COMPARISON: None.    FINDINGS: In the right parietal lobe there is a small focus of  decreased cerebral blood volume and a focus of corresponding delayed  time to drain consistent with an acute cortical infarct. This is most  likely in the territory of the posterior circulation.    Abnormal perfusion is visible in the old infarct in the left occipital  lobe.    There is general delayed time to drain in the posterior temporal lobes  and occipital lobes and the parietal lobes consistent with the  occluded right vertebral artery. There is no other evidence of acute  infarct or ischemia.      Impression    IMPRESSION:   1. Probable small cortical infarct in the lateral right parietal lobe  with no significant penumbra of ischemia.  2. Delayed time to drain in the posterior circulation generally,  likely related to the occluded right vertebral artery.    I called the report to Dr. Gee in the emergency room at 10:21 AM  9/28/2019.    VENKAT GOMEZ MD   MRI Brain w & w/o contrast    Narrative    MRI OF THE BRAIN WITHOUT AND WITH CONTRAST 9/28/2019 7:29 PM     COMPARISON: Head CT 9/28/2019.    HISTORY:  Stroke, follow up.     TECHNIQUE: Axial diffusion-weighted with ADC map, axial T2-weighted  with fat saturation, axial T1-weighted, axial turboFLAIR and coronal  T1-weighted images of the brain were acquired without intravenous  contrast.  Following intravenous administration of gadolinium (4 mL  Gadavist), axial T1-weighted images of the brain were acquired.     FINDINGS: There are recent ischemic infarcts in the superior aspect of  the left cerebellar hemisphere, posterior aspect of the right frontal  lobe and mid left frontal lobe. Given the multiple vascular  territories involved in these infarcts, embolic infarcts from  a  central embolic source should be considered.    There is severe diffuse cerebral volume loss. There are extensive  confluent periventricular areas of abnormal T2 signal hyperintensity  in the cerebral white matter bilaterally that are consistent with  sequela of chronic small vessel ischemic disease. Chronic  encephalomalacia in the left occipital lobe again noted.    The ventricles and basal cisterns are within normal limits in  configuration given the degree of cerebral volume loss.  There is no  midline shift.  There are no extra-axial fluid collections.  There is  no evidence for acute intracranial hemorrhage.  There is no abnormal  contrast enhancement in the brain or its coverings.    There is no sinusitis or mastoiditis.      Impression    IMPRESSION:   1. Recent ischemic infarcts in the left frontal lobe, right frontal  lobe and left cerebellar hemisphere consistent with embolic infarcts  from a central source.  2. Diffuse cerebral volume loss and cerebral white matter changes  consistent with chronic small vessel ischemic disease.    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    COMPARISON: None.    FLUOROSCOPY TIME: 1.8 minutes.  SPOT IMAGES OR CINE RUNS: 9    FINDINGS:    Thin: Deep penetration of the vestibule. Improved with chin tuck  maneuver.    Nectar: Normal swallows. Minimal silent aspiration between swallows  one time.    Honey: Not administered.    Pudding: Normal.    Semisolid: Not administered.    Solid: Not administered.    This study only includes the cervical esophagus.    VENKAT GOMEZ MD       Discharge Medications   Current Discharge Medication List      START taking these medications    Details   acetaminophen (TYLENOL) 650 MG suppository Place 1 suppository (650 mg) rectally every 6 hours as needed for mild pain or fever (temperature over 100  F  )  Qty: 10 suppository, Refills: 0    Comments: Refills by hospice  Associated Diagnoses: End of life care      atropine 1 % ophthalmic solution Place 1-2 drops under the tongue every hour as needed for other (secretions)  Qty: 1 Bottle, Refills: 0    Comments: Refills by hospice  Associated Diagnoses: End of life care      bisacodyl (DULCOLAX) 10 MG suppository Place 1 suppository (10 mg) rectally once as needed for other (for no bowel movement for 72 hours)  Qty: 5 suppository, Refills: 0    Comments: Refills by hospice  Associated Diagnoses: End of life care      LORazepam (ATIVAN) 2 MG/ML (HIGH CONC) solution Take 0.125-0.25 mLs (0.25-0.5 mg) by mouth, place under tongue or insert rectally every 4 hours as needed for anxiety (restlessness)  Qty: 30 mL, Refills: 0    Comments: Refills by hospice  Associated Diagnoses: End of life care      MEDICATION INSTRUCTION If care facility cannot accept or use ranges, facility is instructed to use lower end of dosing range    Associated Diagnoses: End of life care      morphine sulfate HIGH CONCENTRATE (ROXANOL) 10 mg/0.5 mL (HIGH CONC) solution Place 0.25 mLs (5 mg) under the tongue every 2 hours as needed for pain  Qty: 15 mL, Refills: 0    Comments: Refills by hospice  Associated Diagnoses: End of life care      ondansetron (ZOFRAN-ODT) 4 MG ODT tab Take 1 tablet (4 mg) by mouth every 6 hours as needed for nausea or vomiting  Qty: 10 tablet, Refills: 0    Comments: Refills by hospice  Associated Diagnoses: End of life care         CONTINUE these medications which have CHANGED    Details   acetaminophen (TYLENOL) 325 MG tablet Take 2 tablets (650 mg) by mouth every 6 hours as needed for mild pain or fever (temperature over 100  F )    Associated Diagnoses: End of life care         CONTINUE these medications which have NOT CHANGED    Details   diphenhydrAMINE (BENADRYL) 25 MG tablet Take 25 mg by mouth every 6 hours as needed for itching or allergies      pantoprazole  (PROTONIX) 20 MG EC tablet Take 20 mg by mouth daily Takes at 1600       senna-docusate (SENOKOT-S;PERICOLACE) 8.6-50 MG per tablet Take 1 tablet by mouth 2 times daily as needed for constipation         STOP taking these medications       furosemide (LASIX) 20 MG tablet Comments:   Reason for Stopping:         multivitamin, therapeutic (THERA-VIT) TABS Comments:   Reason for Stopping:             Allergies   No Known Allergies

## 2019-10-02 NOTE — PLAN OF CARE
Pt here with multiple acute CVAs. A&Ox3, disoriented to time. Comfort care measures maintained. Full nectar thick liquid diet. Takes pills crushed. Bedrest, turn/repo. Incontinent of bladder, no BM. Denies pain. Pt scoring green on the Aggression Stop Light Tool. Discharge to [facility] to enroll in hospice. Transport arranged for 0945.

## 2019-10-02 NOTE — PLAN OF CARE
Speech Language Therapy Discharge Summary    Reason for therapy discharge:    Discharged to transitional care facility.    Progress towards therapy goal(s). See goals on Care Plan in [facility] electronic health record for goal details.  Goals not met.  Barriers to achieving goals:   discharge from facility.    Therapy recommendation(s):    Continued therapy is recommended.  Rationale/Recommendations:  Patient will need on going ST needs for dysphagia management and diet tolerance and advancement. Patient is motivated and below her baseline.     Pt discharged on the following diet/recommendations:  1. Full liquid diet with nectar thick liquids by spoon. 2. Upright, assistance with feeding, liquids by spoon with a chin tuck, double swallow and alternate liquids/solids. Hold diet if respiratory status changes.

## 2019-10-02 NOTE — PLAN OF CARE
Pt here with multiple CVAs with L side deficits. Comfort cares. A&Ox3. Up with A2/lift. Turn and repo. Incontinent of bladder. Full liquid nectar thick liquids. Denies pain. Anxious in the evening but slept well overnight. Scoring green on aggression stop light tool. Enrolling in hospice and discharging to [facility] at 0945 today.

## 2019-10-02 NOTE — PROGRESS NOTES
CM    I: Pt has been medically cleared for discharge. Orders were faxed to: [facility] #[phone]. HE stretcher to arrive at 9:45 AM. PCS on chart.    P: No further SW interventions anticipated at this time.    ESTRELLITA Mei

## 2019-10-02 NOTE — PROVIDER NOTIFICATION
"Dr. Rehman paged, \"Transport scheduled for 9:45am. Can you please come and complete discharge orders? Thank you.\"  "

## 2019-10-04 ENCOUNTER — DOCUMENTATION ONLY (OUTPATIENT)
Dept: OTHER | Facility: CLINIC | Age: 84
End: 2019-10-04